# Patient Record
Sex: FEMALE | Race: WHITE | NOT HISPANIC OR LATINO | Employment: FULL TIME | URBAN - METROPOLITAN AREA
[De-identification: names, ages, dates, MRNs, and addresses within clinical notes are randomized per-mention and may not be internally consistent; named-entity substitution may affect disease eponyms.]

---

## 2017-02-10 ENCOUNTER — OUTSIDE FACILITY SERVICE (OUTPATIENT)
Dept: SURGERY | Facility: CLINIC | Age: 52
End: 2017-02-10

## 2017-02-10 PROCEDURE — 45378 DIAGNOSTIC COLONOSCOPY: CPT | Performed by: SURGERY

## 2017-10-19 ENCOUNTER — APPOINTMENT (OUTPATIENT)
Dept: WOMENS IMAGING | Facility: HOSPITAL | Age: 52
End: 2017-10-19

## 2017-10-19 PROCEDURE — 77067 SCR MAMMO BI INCL CAD: CPT | Performed by: RADIOLOGY

## 2017-10-19 PROCEDURE — 77063 BREAST TOMOSYNTHESIS BI: CPT | Performed by: RADIOLOGY

## 2018-03-27 ENCOUNTER — OFFICE VISIT (OUTPATIENT)
Dept: INTERNAL MEDICINE | Facility: CLINIC | Age: 53
End: 2018-03-27

## 2018-03-27 ENCOUNTER — HOSPITAL ENCOUNTER (OUTPATIENT)
Dept: GENERAL RADIOLOGY | Facility: HOSPITAL | Age: 53
Discharge: HOME | End: 2018-03-27
Admitting: INTERNAL MEDICINE

## 2018-03-27 VITALS
SYSTOLIC BLOOD PRESSURE: 112 MMHG | HEIGHT: 66 IN | DIASTOLIC BLOOD PRESSURE: 78 MMHG | BODY MASS INDEX: 30.74 KG/M2 | WEIGHT: 191.3 LBS | HEART RATE: 81 BPM | TEMPERATURE: 98 F | OXYGEN SATURATION: 98 %

## 2018-03-27 DIAGNOSIS — R19.00 PELVIC MASS IN FEMALE: ICD-10-CM

## 2018-03-27 DIAGNOSIS — R10.32 LEFT GROIN PAIN: Primary | ICD-10-CM

## 2018-03-27 PROCEDURE — 99213 OFFICE O/P EST LOW 20 MIN: CPT | Performed by: INTERNAL MEDICINE

## 2018-03-27 PROCEDURE — 73502 X-RAY EXAM HIP UNI 2-3 VIEWS: CPT

## 2018-03-27 RX ORDER — CHLORAL HYDRATE 500 MG
CAPSULE ORAL
COMMUNITY

## 2018-03-27 RX ORDER — NAPROXEN 500 MG/1
500 TABLET ORAL 2 TIMES DAILY WITH MEALS
Qty: 30 TABLET | Refills: 1 | Status: SHIPPED | OUTPATIENT
Start: 2018-03-27

## 2018-03-27 NOTE — PROGRESS NOTES
Subjective   Valerie Avina is a 52 y.o. female.   Pt has been having post/medial upper thigh pain    History of Present Illness   Pt is feeling numbness on the upper back thigh that radiates down the medial aspect of her left thigh.  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  She denies any trauma or change in physical activity    Review of Systems   All other systems reviewed and are negative.      Objective   Physical Exam   Constitutional: She is oriented to person, place, and time. She appears well-developed and well-nourished.   HENT:   Head: Normocephalic and atraumatic.   Right Ear: External ear normal.   Left Ear: External ear normal.   Mouth/Throat: Oropharynx is clear and moist.   Eyes: Conjunctivae and EOM are normal. Pupils are equal, round, and reactive to light.   Neck: Normal range of motion. No tracheal deviation present. No thyromegaly present.   Abdominal: Soft. Bowel sounds are normal. She exhibits no distension. There is no tenderness.   Musculoskeletal: Normal range of motion. She exhibits no edema or deformity.   Neurological: She is alert and oriented to person, place, and time.   Skin: Skin is warm and dry.   Psychiatric: She has a normal mood and affect. Her behavior is normal. Judgment and thought content normal.   Vitals reviewed.      Vitals:    03/27/18 0849   BP: 112/78   Pulse: 81   Temp: 98 °F (36.7 °C)   SpO2: 98%          Assessment/Plan   Valerie was seen today for leg pain.    Diagnoses and all orders for this visit:    Left groin pain  -     XR Hip With or Without Pelvis 2 - 3 View Left  -     naproxen (NAPROSYN) 500 MG tablet; Take 1 tablet by mouth 2 (Two) Times a Day With  Meals.    Pelvic mass in female      1.  Left groin pain/numbness in the leg-  Comes and goes  I suspect that this is related to scar tissues and weight gain.  We will check an xray and try an NSAID  If sx cont she will need a further MOHAN given hx of this benign pelvic tumor  2.  Pelvic mass-  By hx  I have no records  We will request and FU on this

## 2018-10-23 ENCOUNTER — APPOINTMENT (OUTPATIENT)
Dept: WOMENS IMAGING | Facility: HOSPITAL | Age: 53
End: 2018-10-23

## 2018-10-23 PROCEDURE — 77067 SCR MAMMO BI INCL CAD: CPT | Performed by: RADIOLOGY

## 2019-10-24 ENCOUNTER — APPOINTMENT (OUTPATIENT)
Dept: WOMENS IMAGING | Facility: HOSPITAL | Age: 54
End: 2019-10-24

## 2019-10-24 PROCEDURE — 77063 BREAST TOMOSYNTHESIS BI: CPT | Performed by: RADIOLOGY

## 2019-10-24 PROCEDURE — 77067 SCR MAMMO BI INCL CAD: CPT | Performed by: RADIOLOGY

## 2020-10-29 ENCOUNTER — APPOINTMENT (OUTPATIENT)
Dept: WOMENS IMAGING | Facility: HOSPITAL | Age: 55
End: 2020-10-29

## 2020-10-29 PROCEDURE — 77067 SCR MAMMO BI INCL CAD: CPT | Performed by: RADIOLOGY

## 2020-10-29 PROCEDURE — 77063 BREAST TOMOSYNTHESIS BI: CPT | Performed by: RADIOLOGY

## 2021-12-09 ENCOUNTER — APPOINTMENT (OUTPATIENT)
Dept: WOMENS IMAGING | Facility: HOSPITAL | Age: 56
End: 2021-12-09

## 2021-12-09 PROCEDURE — 76641 ULTRASOUND BREAST COMPLETE: CPT | Performed by: RADIOLOGY

## 2021-12-09 PROCEDURE — 77066 DX MAMMO INCL CAD BI: CPT | Performed by: RADIOLOGY

## 2021-12-09 PROCEDURE — G0279 TOMOSYNTHESIS, MAMMO: HCPCS | Performed by: RADIOLOGY

## 2021-12-09 PROCEDURE — 77061 BREAST TOMOSYNTHESIS UNI: CPT | Performed by: RADIOLOGY

## 2022-02-23 ENCOUNTER — TELEPHONE (OUTPATIENT)
Dept: SURGERY | Facility: CLINIC | Age: 57
End: 2022-02-23

## 2022-02-28 NOTE — TELEPHONE ENCOUNTER
Pt called and wanted to speak to Macy about scheduling a c- scope. Let pt know that Macy wont be in until Tuesday. Pt understood and will give msg to Macy

## 2022-03-03 ENCOUNTER — PREP FOR SURGERY (OUTPATIENT)
Dept: SURGERY | Facility: SURGERY CENTER | Age: 57
End: 2022-03-03

## 2022-03-03 DIAGNOSIS — Z86.010 PERSONAL HISTORY OF COLONIC POLYPS: Primary | ICD-10-CM

## 2022-03-17 ENCOUNTER — TELEPHONE (OUTPATIENT)
Dept: ORTHOPEDIC SURGERY | Facility: CLINIC | Age: 57
End: 2022-03-17

## 2022-03-17 NOTE — TELEPHONE ENCOUNTER
Caller: Valerie Avina    Relationship to patient: Self    Best call back number:[phone]  Patient is needing: CALLBACK TO SCHEDULE  GRADE 2  RT ANKLE SPRAIN; PATIENT WAS SEEN IN [place] E/R 3.12.22 AND HAD XRAYS TAKEN   PATIENT IS CURRENTLY WEARING A BOOT    PATIENT WAS FORMER PATIENT OF BRYAN AND REQUESTED HIM      UNABLE TO WARM TRANSFER

## 2022-03-23 ENCOUNTER — OFFICE VISIT (OUTPATIENT)
Dept: ORTHOPEDIC SURGERY | Facility: CLINIC | Age: 57
End: 2022-03-23

## 2022-03-23 VITALS — BODY MASS INDEX: 29.89 KG/M2 | TEMPERATURE: 96.7 F | HEIGHT: 66 IN | WEIGHT: 186 LBS

## 2022-03-23 DIAGNOSIS — S92.214A CLOSED NONDISPLACED FRACTURE OF CUBOID OF RIGHT FOOT, INITIAL ENCOUNTER: ICD-10-CM

## 2022-03-23 DIAGNOSIS — R52 PAIN: Primary | ICD-10-CM

## 2022-03-23 DIAGNOSIS — M79.671 PAIN ASSOCIATED WITH ACCESSORY NAVICULAR BONE OF FOOT, RIGHT: ICD-10-CM

## 2022-03-23 DIAGNOSIS — Q74.2 PAIN ASSOCIATED WITH ACCESSORY NAVICULAR BONE OF FOOT, RIGHT: ICD-10-CM

## 2022-03-23 PROCEDURE — 73610 X-RAY EXAM OF ANKLE: CPT | Performed by: ORTHOPAEDIC SURGERY

## 2022-03-23 PROCEDURE — 73620 X-RAY EXAM OF FOOT: CPT | Performed by: ORTHOPAEDIC SURGERY

## 2022-03-23 PROCEDURE — 99204 OFFICE O/P NEW MOD 45 MIN: CPT | Performed by: ORTHOPAEDIC SURGERY

## 2022-03-23 RX ORDER — HYDROCODONE BITARTRATE AND ACETAMINOPHEN 7.5; 325 MG/1; MG/1
TABLET ORAL
COMMUNITY
Start: 2022-03-12 | End: 2022-04-20

## 2022-03-23 RX ORDER — METHOCARBAMOL 750 MG/1
TABLET, FILM COATED ORAL
COMMUNITY
Start: 2022-03-12

## 2022-03-23 RX ORDER — CHOLECALCIFEROL (VITAMIN D3) 125 MCG
5 CAPSULE ORAL
COMMUNITY

## 2022-03-23 RX ORDER — ZOLPIDEM TARTRATE 10 MG/1
10 TABLET ORAL NIGHTLY PRN
COMMUNITY
Start: 2022-03-15

## 2022-03-23 NOTE — PROGRESS NOTES
New Patient Complaint      Patient: Valerie Avina  YOB: 1965 56 y.o. female  Medical Record Number: [id number]    Chief Complaints: I hurt my ankle    History of Present Illness: Patient injured her right ankle on 3/8/2022 when she was running down some back steps and rolled on a sidewalk with onset of pain and swelling mainly in the inferolateral and medial aspects of the right ankle.    She was without prior history of injury to the ankle.    She was seen in the ER on 3/12/2022 and has been using a boot but no other assistive device with moderate aching pain mainly over the inferomedial and inferolateral aspect of the right midfoot hindfoot area more so than at the ankle itself.  She does report that the boot has aggravated her great toe to some degree on the right foot    I did a bunion surgery on her left foot in remote past which she said is doing great         HPI    Allergies: No Known Allergies    Medications:   Current Outpatient Medications on File Prior to Visit   Medication Sig   • melatonin 5 MG tablet tablet Take 5 mg by mouth.   • Omega-3 Fatty Acids (FISH OIL) 1000 MG capsule capsule Take  by mouth Daily With Breakfast.   • buPROPion XL (WELLBUTRIN XL) 300 MG 24 hr tablet Take 300 mg by mouth daily.   • naproxen (NAPROSYN) 500 MG tablet Take 1 tablet by mouth 2 (Two) Times a Day With Meals.   • Omega-3 Fatty Acids (OMEGA-3 1450 PO) Take  by mouth.   • [DISCONTINUED] zolpidem (AMBIEN) 5 MG tablet Take 5 mg by mouth at night as needed for sleep.     No current facility-administered medications on file prior to visit.       Past Medical History:   Diagnosis Date   • Ankle sprain 2weeks ago   • Anxiety    • Depression    • Fracture of wrist 20 years ago   • Insomnia    • Sleep difficulties    • Tear of meniscus of knee 10 years ago     Past Surgical History:   Procedure Laterality Date   • FOOT SURGERY Left    • HYSTERECTOMY     • KNEE ARTHROSCOPY Right    • NASAL SEPTUM SURGERY     •  "SEPTOPLASTY       Social History     Occupational History   • Not on file   Tobacco Use   • Smoking status: Passive Smoke Exposure - Never Smoker   • Smokeless tobacco: Never Used   • Tobacco comment: None   Substance and Sexual Activity   • Alcohol use: No   • Drug use: No   • Sexual activity: Yes     Partners: Female     Birth control/protection: None     Comment: Menapause      Social History     Social History Narrative   • Not on file     Family History   Problem Relation Age of Onset   • Cancer Mother    • Heart failure Father        Review of Systems: 14 point review of systems performed, positive pertinent findings identified in HPI. All remaining systems negative     Review of Systems      Physical Exam:   Vitals:    03/23/22 1032   Temp: 96.7 °F (35.9 °C)   Weight: 84.4 kg (186 lb)   Height: 167.6 cm (66\")     Physical Exam   Constitutional: pleasant, well developed   Eyes: sclera non icteric  Hearing : adequate for exam  Cardiovascular: palpable pulses in right foot, right calf/ thigh NT without sign of DVT  Respiratoy: breathing unlabored   Neurological: grossly sensate to LT throughout right LE  Psychiatric: oriented with normal mood and affect.   Lymphatic: No palpable popliteal lymphadenopathy right LE  Skin: intact throughout right leg/foot  Musculoskeletal: On exam she is ambulating without assistive device.  She was without focal tenderness along the anterolateral ligamentous structures or peroneal tendons of the right ankle were medial malleolus.  There is moderate discomfort along the medial aspect of the navicular but was able to actively invert well with minimal discomfort.  There was moderate discomfort in the inferolateral hindfoot along the calcaneocuboid joint.  She was nontender at all over the dorsum of the midfoot.  There was minimal discomfort on the right great toe which showed very slight hallux valgus  Physical Exam  Ortho Exam    Radiology: 3 views of the right ankle including " lateral view of the foot and 2 views of the right foot ordered evaluate pain reviewed and no prior x-rays available for comparison.  I do not see any obvious fracture or widening of the syndesmosis talus appears to be well-seated.  There may be a small bone island in the anterior distal tibia..    There does appear to have a large accessory navicular I do not see clear fracture of the medial navicular.  There appears to be a small avulsion over the distal aspect of the calcaneocuboid joint adjacent to the lateral aspect of the proximal cuboid.  I do not see a clear fracture of the anterior process of the calcaneus.  There is mild hallux valguswith some arthritis of the first MTP joint.  There may also be a nondisplaced fracture of the proximal extent of the middle cuneiform I do not see any widening of the Lisfranc interval.        Assessment/Plan: 1.  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  Right ankle calcaneocuboid capsular injury with cuboid avulsion fracture  3.  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  4.  Possible right middle cuneiform fracture    We discussed treatment going forward and I do not see any focal pathology about the ankle or peroneal tendons that I think would necessitate further work-up at this time.    She may have had some disruption of the synchondrosis around the medial accessory navicular but would not recommend surgical treatment at this time so we will hold off on any further work-up.    Reviewed her that I would not recommend any surgical treatment for the calcaneocuboid injury at this point and could even have other occult fractures but would not change our treatment protocol.    We will have her continue with the boot for at least another 2 weeks and is going to get a power step orthotic to place in that.  She will offload this with a cane in the contralateral hand.    She is doing well in 2 weeks she may then start transitioning to a PT TD  brace in the house for a week and boot out of the house and if does well over that period may then transition to just a PT TD brace but continue with use of the cane.    We will see her back in 4 weeks with x-rays of her right foot if she still having pain.

## 2022-04-20 ENCOUNTER — OFFICE VISIT (OUTPATIENT)
Dept: ORTHOPEDIC SURGERY | Facility: CLINIC | Age: 57
End: 2022-04-20

## 2022-04-20 VITALS — TEMPERATURE: 96.6 F | WEIGHT: 194.2 LBS | BODY MASS INDEX: 31.21 KG/M2 | HEIGHT: 66 IN

## 2022-04-20 DIAGNOSIS — M19.071 ARTHRITIS OF FIRST METATARSOPHALANGEAL (MTP) JOINT OF RIGHT FOOT: ICD-10-CM

## 2022-04-20 DIAGNOSIS — S92.214D CLOSED NONDISPLACED FRACTURE OF CUBOID OF RIGHT FOOT WITH ROUTINE HEALING, SUBSEQUENT ENCOUNTER: Primary | ICD-10-CM

## 2022-04-20 DIAGNOSIS — Q74.2 PAIN ASSOCIATED WITH ACCESSORY NAVICULAR BONE OF FOOT, RIGHT: ICD-10-CM

## 2022-04-20 DIAGNOSIS — M79.671 PAIN ASSOCIATED WITH ACCESSORY NAVICULAR BONE OF FOOT, RIGHT: ICD-10-CM

## 2022-04-20 DIAGNOSIS — M20.11 HALLUX VALGUS OF RIGHT FOOT: ICD-10-CM

## 2022-04-20 PROCEDURE — 99213 OFFICE O/P EST LOW 20 MIN: CPT | Performed by: ORTHOPAEDIC SURGERY

## 2022-04-20 PROCEDURE — 73630 X-RAY EXAM OF FOOT: CPT | Performed by: ORTHOPAEDIC SURGERY

## 2022-04-20 RX ORDER — DICLOFENAC SODIUM 20 MG/G
1 SOLUTION TOPICAL 2 TIMES DAILY
Qty: 112 G | Refills: 11 | Status: SHIPPED | OUTPATIENT
Start: 2022-04-20

## 2022-04-20 NOTE — PROGRESS NOTES
Ankle Follow Up      Patient: Valerie Avina    YOB: 1965 56 y.o. female    Chief Complaints: Ankle feeling better    History of Present Illness:Patient was seen on 3/23/2022 after she injured her right ankle on 3/8/2022 when she was running down some back steps and rolled on a sidewalk with onset of pain and swelling mainly in the inferolateral and medial aspects of the right ankle.     She was without prior history of injury to the ankle.     She was seen in the ER on 3/12/2022 and had been using a boot but no other assistive device with moderate aching pain mainly over the inferomedial and inferolateral aspect of the right midfoot hindfoot area more so than at the ankle itself.  She reported that the boot had aggravated her great toe to some degree on the right foot     I did a bunion surgery on her left foot in remote past which she said was doing great.     Reviewed with her that the of her symptoms seem to be coming from her accessory navicular and reviewed her there was possible middle cuneiform fracture and calcaneocuboid capsule ligamentous injury.   However given her lack of pain on the peroneal tendons no further work-up was recommended at that time and she was instructed to continue with her boot and to get a power step orthotic to use in it and offload with a cane and do all that for at least another 2 weeks and then to transition to a PT TD brace in the house for a week and then boot out of the house for a week and then to transition to the PT TD brace.    She is seen back today stating that she never used the cane and quit using the boot 10 days ago.  She really could not use the PT TD brace as it did not fit in her shoe and she is not having any pain at all in the inferolateral aspect of the right hindfoot and the pain over the inferomedial aspect of the hindfoot has improved at least 80%.   HPI    ROS: ankle pain  Past Medical History:   Diagnosis Date   • Ankle sprain 2weeks ago   •  "Anxiety    • Depression    • Fracture of wrist 20 years ago   • Insomnia    • Sleep difficulties    • Tear of meniscus of knee 10 years ago       Physical Exam:   Vitals:    04/20/22 0950   Temp: 96.6 °F (35.9 °C)   Weight: 88.1 kg (194 lb 3.2 oz)   Height: 167.6 cm (66\")     Well developed with normal mood.  Exam she has some mild discomfort over the inferomedial hindfoot along her accessory navicular and no pain at all to palpation over the cuboid or anterolateral ligamentous structures nor peroneal tendons.  Nontender over the dorsum of the midfoot      Radiology: 3 views of the right foot ordered evaluate pain reviewed and compared to previous x-rays.  The questionable area of fracture at the proximal extent of the middle cuneiform is less evident today.  There still remains a large accessory navicular without change in alignment and possible fracture of the cuboid through the midportion of the body that was not is evident on previous x-rays and is only evident on the AP view only on the oblique or lateral films today.  The small capsuloligamentous potential injury at the calcaneocuboid joint appears to be healing.  There remains some mild hallux valgus with first MTP arthritis      Assessment/Plan: 1.  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  Right ankle calcaneocuboid capsular injury with cuboid avulsion fracture  3.  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  4.  Possible right middle cuneiform fracture  5.  Possible right cuboid fracture    We discussed treatment and reviewed her x-ray findings with her and although there is some questionable area of fracture through the midportion of the cuboid she is not at all tender in that area so this may not be real and would not require any surgical treatment at this time if it does as it is not symptomatic.    She is still only having some symptoms around the medial navicular but is markedly improved subtle think we need to do " any further work-up.    She will continue with sturdy stiff accommodative shoes with arch support and limit activities to that of daily living only with no running or jumping.  I did prescribe Pennsaid for to apply to the area over the inferomedial hindfoot along her accessory navicular twice daily.    If anything worsens at all she will get office and let me know otherwise I will see her back in 4 weeks with x-rays of her right foot.

## 2022-05-05 PROBLEM — Z86.010 PERSONAL HISTORY OF COLONIC POLYPS: Status: ACTIVE | Noted: 2022-05-05

## 2022-05-05 PROBLEM — Z86.0100 PERSONAL HISTORY OF COLONIC POLYPS: Status: ACTIVE | Noted: 2022-05-05

## 2022-05-11 NOTE — SIGNIFICANT NOTE
Education provided the Patient on the following:    - Nothing to Eat or Drink after MN the night before the procedure    - Avoid red/purple fluids while completing their bowel prep as ordered by physician  -Contact Gastrointerologist office for any questions about specific details regarding colon prep    -You will need to have someone drive you home after your colonoscopy and remain with you for 24 hours after the procedure  - The date of your Surgery, you may have one visitor at bedside or within 10-15 minutes of [facility]  -Please wear warm socks when you arrive for your colonoscopy  -Remove all jewelry and leave any valuables before arriving the day of your procedure (all will have to be removed before leaving preop)  -You will need to arrive at 0730 on 5/16 for your colonoscopy    -Feel free to contact us at: [phone] with any additional questions/concerns

## 2022-05-16 ENCOUNTER — ANESTHESIA EVENT (OUTPATIENT)
Dept: SURGERY | Facility: SURGERY CENTER | Age: 57
End: 2022-05-16

## 2022-05-16 ENCOUNTER — HOSPITAL ENCOUNTER (OUTPATIENT)
Facility: SURGERY CENTER | Age: 57
Setting detail: HOSPITAL OUTPATIENT SURGERY
Discharge: HOME | End: 2022-05-16
Attending: SURGERY | Admitting: SURGERY

## 2022-05-16 ENCOUNTER — ANESTHESIA (OUTPATIENT)
Dept: SURGERY | Facility: SURGERY CENTER | Age: 57
End: 2022-05-16

## 2022-05-16 VITALS
RESPIRATION RATE: 16 BRPM | TEMPERATURE: 97.3 F | OXYGEN SATURATION: 96 % | DIASTOLIC BLOOD PRESSURE: 66 MMHG | HEART RATE: 67 BPM | BODY MASS INDEX: 30.18 KG/M2 | SYSTOLIC BLOOD PRESSURE: 107 MMHG | WEIGHT: 187 LBS

## 2022-05-16 PROCEDURE — 45378 DIAGNOSTIC COLONOSCOPY: CPT | Performed by: SURGERY

## 2022-05-16 PROCEDURE — 25010000002 PROPOFOL 10 MG/ML EMULSION: Performed by: ANESTHESIOLOGY

## 2022-05-16 PROCEDURE — S0260 H&P FOR SURGERY: HCPCS | Performed by: SURGERY

## 2022-05-16 RX ORDER — LIDOCAINE HYDROCHLORIDE 10 MG/ML
0.5 INJECTION, SOLUTION INFILTRATION; PERINEURAL ONCE AS NEEDED
Status: DISCONTINUED | OUTPATIENT
Start: 2022-05-16 | End: 2022-05-16 | Stop reason: HOSPADM

## 2022-05-16 RX ORDER — SODIUM CHLORIDE, SODIUM LACTATE, POTASSIUM CHLORIDE, CALCIUM CHLORIDE 600; 310; 30; 20 MG/100ML; MG/100ML; MG/100ML; MG/100ML
INJECTION, SOLUTION INTRAVENOUS CONTINUOUS PRN
Status: DISCONTINUED | OUTPATIENT
Start: 2022-05-16 | End: 2022-05-16 | Stop reason: SURG

## 2022-05-16 RX ORDER — PROPOFOL 10 MG/ML
VIAL (ML) INTRAVENOUS AS NEEDED
Status: DISCONTINUED | OUTPATIENT
Start: 2022-05-16 | End: 2022-05-16 | Stop reason: SURG

## 2022-05-16 RX ORDER — MAGNESIUM HYDROXIDE 1200 MG/15ML
LIQUID ORAL AS NEEDED
Status: DISCONTINUED | OUTPATIENT
Start: 2022-05-16 | End: 2022-05-16 | Stop reason: HOSPADM

## 2022-05-16 RX ORDER — PROPOFOL 10 MG/ML
VIAL (ML) INTRAVENOUS CONTINUOUS PRN
Status: DISCONTINUED | OUTPATIENT
Start: 2022-05-16 | End: 2022-05-16 | Stop reason: SURG

## 2022-05-16 RX ORDER — SODIUM CHLORIDE, SODIUM LACTATE, POTASSIUM CHLORIDE, CALCIUM CHLORIDE 600; 310; 30; 20 MG/100ML; MG/100ML; MG/100ML; MG/100ML
1000 INJECTION, SOLUTION INTRAVENOUS CONTINUOUS
Status: DISCONTINUED | OUTPATIENT
Start: 2022-05-16 | End: 2022-05-16 | Stop reason: HOSPADM

## 2022-05-16 RX ORDER — SODIUM CHLORIDE 0.9 % (FLUSH) 0.9 %
10 SYRINGE (ML) INJECTION AS NEEDED
Status: DISCONTINUED | OUTPATIENT
Start: 2022-05-16 | End: 2022-05-16 | Stop reason: HOSPADM

## 2022-05-16 RX ORDER — LIDOCAINE HYDROCHLORIDE 20 MG/ML
INJECTION, SOLUTION INFILTRATION; PERINEURAL AS NEEDED
Status: DISCONTINUED | OUTPATIENT
Start: 2022-05-16 | End: 2022-05-16 | Stop reason: SURG

## 2022-05-16 RX ADMIN — SODIUM CHLORIDE, POTASSIUM CHLORIDE, SODIUM LACTATE AND CALCIUM CHLORIDE 1000 ML: 600; 310; 30; 20 INJECTION, SOLUTION INTRAVENOUS at 08:32

## 2022-05-16 RX ADMIN — SODIUM CHLORIDE, SODIUM LACTATE, POTASSIUM CHLORIDE, AND CALCIUM CHLORIDE: .6; .31; .03; .02 INJECTION, SOLUTION INTRAVENOUS at 08:52

## 2022-05-16 RX ADMIN — Medication 160 MCG/KG/MIN: at 08:52

## 2022-05-16 RX ADMIN — LIDOCAINE HYDROCHLORIDE 60 MG: 20 INJECTION, SOLUTION INFILTRATION; PERINEURAL at 08:52

## 2022-05-16 RX ADMIN — PROPOFOL 100 MG: 10 INJECTION, EMULSION INTRAVENOUS at 08:52

## 2022-05-16 NOTE — OP NOTE
Operative Note:    Pre-op dx: Screening Colonoscopy, personal history of polyps    Post-op dx: diverticulosis    Procedure: Colonoscopy    Surgeon: Khadra Sanchez MD    Anesthesia: MAC    EBL: none    Specimen:  * No orders in the log *    Complications: none    Procedure:    Colonoscopy:  A digital rectal examination was performed which revealed no rectal masses.  Scope was introduced into the rectum and advanced into the sigmoid, descending colon, splenic flexure, transverse colon, hepatic flexure, ascending colon and into the cecum.  Cecum was identified by the ileocecal valve and appendiceal orifice.  Patient had mild diverticulosis in the sigmoid colon.  There was no evidence of any polyps, masses, AV malformation or inflammation.  The bowel preparation was excellent. The scope was slowly withdrawn and a second look was performed.  Upon the second look, there were no new findings.  The colon was desufflated and the procedure was terminated.   Patient was transferred to recovery room in stable condition.      Assessment/Plan:  Repeat colonoscopy in 5 years.  Personal history of polyps  Mild diverticulosis     Khadra Sanchez MD  General, Minimally Invasive and Endoscopic Surgery  [facility] [place] [id number] [facility] [address] [place] [address]   [address]    [address]  [place] [address]               [place] [address]    P: [phone]  F: [phone]    Cc:  Dennise Gandhi MD

## 2022-05-16 NOTE — ANESTHESIA POSTPROCEDURE EVALUATION
Patient: Valerie Avina    Procedure Summary     Date: 05/16/22 Room / Location: [facility] OR 05 / [facility]    Anesthesia Start: 0847 Anesthesia Stop: 0923    Procedure: COLONOSCOPY (N/A ) Diagnosis:       Personal history of colonic polyps      (Personal history of colonic polyps [Z86.010])    Surgeons: Khadra Sanchez MD Provider: Kush Fu MD    Anesthesia Type: MAC ASA Status: 2          Anesthesia Type: MAC    Vitals  Vitals Value Taken Time   /66 05/16/22 0941   Temp 36.3 °C (97.3 °F) 05/16/22 0922   Pulse 67 05/16/22 0941   Resp 16 05/16/22 0941   SpO2 96 % 05/16/22 0941           Post Anesthesia Care and Evaluation    Patient location during evaluation: bedside  Patient participation: complete - patient participated  Level of consciousness: awake and alert  Pain management: adequate  Airway patency: patent  Anesthetic complications: No anesthetic complications    Cardiovascular status: acceptable  Respiratory status: acceptable  Hydration status: acceptable    Comments: /66   Pulse 67   Temp 36.3 °C (97.3 °F)   Resp 16   Wt 84.8 kg (187 lb)   SpO2 96%   BMI 30.18 kg/m²

## 2022-05-16 NOTE — ANESTHESIA PREPROCEDURE EVALUATION
Anesthesia Evaluation     Patient summary reviewed and Nursing notes reviewed   NPO Solid Status: > 8 hours  NPO Liquid Status: > 2 hours           Airway   Mallampati: I  TM distance: >3 FB  Neck ROM: full  No difficulty expected  Dental - normal exam     Pulmonary - negative pulmonary ROS and normal exam   Cardiovascular - normal exam  Exercise tolerance: good (4-7 METS)    (-) hypertension      Neuro/Psych- negative ROS  (-) seizures, CVA  GI/Hepatic/Renal/Endo - negative ROS   (-) diabetes    Musculoskeletal (-) negative ROS    Abdominal  - normal exam    Bowel sounds: normal.   Substance History - negative use  (-) alcohol use, drug use     OB/GYN negative ob/gyn ROS         Other - negative ROS                       Anesthesia Plan    ASA 2     MAC     intravenous induction     Anesthetic plan, all risks, benefits, and alternatives have been provided, discussed and informed consent has been obtained with: patient.        CODE STATUS:

## 2022-05-16 NOTE — H&P
Date: 2022     Patient: Valerie Avina    : 1965   [id number]     CC:   Colonoscopy, with personal history of colon polyps    History:   The patient is a 57 y.o. female of Dennise Gandhi MD  who presents to discuss colonoscopy with personal history of colon polyps, last colonoscopy was . The patient currently has no complaints.  Patient denies any history of nausea, abdominal pain, weight loss, change in bowel habits or rectal bleeding.  Patient denies melena, hematochezia or BRBPR.  No family history of ulcerative colitis, Crohn's disease or familial polyposis.    The following portions of the patient's history were reviewed and updated as appropriate: allergies, current medications, past family history, past medical history, past social history, past surgical history and problem list.    Past Medical History:   Diagnosis Date   • Ankle sprain 2weeks ago   • Anxiety    • Depression    • Fracture of wrist 20 years ago   • Insomnia    • Sleep difficulties    • Tear of meniscus of knee 10 years ago      Past Surgical History:   Procedure Laterality Date   • FOOT SURGERY Left    • HYSTERECTOMY     • KNEE ARTHROSCOPY Right    • NASAL SEPTUM SURGERY     • SEPTOPLASTY       Medications:   Medications Prior to Admission   Medication Sig Dispense Refill Last Dose   • buPROPion XL (WELLBUTRIN XL) 300 MG 24 hr tablet Take 300 mg by mouth daily.   5/15/2022 at Unknown time   • melatonin 5 MG tablet tablet Take 5 mg by mouth.   Past Week at Unknown time   • Omega-3 Fatty Acids (FISH OIL) 1000 MG capsule capsule Take  by mouth Daily With Breakfast.   Past Week at Unknown time   • zolpidem (AMBIEN) 10 MG tablet Take 10 mg by mouth At Night As Needed.   Past Week at Unknown time   • Diclofenac Sodium (Pennsaid) 2 % solution Apply 1 Pump topically 2 (Two) Times a Day. 112 g 11    • fluocinonide (LIDEX) 0.05 % cream APPLY TO AFFECTED AREA TWICE A DAY      • methocarbamol (ROBAXIN) 750 MG tablet TAKE 1-2 TABLETS BY  MOUTH 3 (THREE) TIMES DAILY AS NEEDED (MUSCLE SPASM).   More than a month at Unknown time   • naproxen (NAPROSYN) 500 MG tablet Take 1 tablet by mouth 2 (Two) Times a Day With Meals. 30 tablet 1 More than a month at Unknown time     Allergies: Patient has no known allergies.   Social History:  Social History     Socioeconomic History   • Marital status:    Tobacco Use   • Smoking status: Passive Smoke Exposure - Never Smoker   • Smokeless tobacco: Never Used   • Tobacco comment: None   Substance and Sexual Activity   • Alcohol use: No   • Drug use: No   • Sexual activity: Yes     Partners: Female     Birth control/protection: None     Comment: Menapause      Family History   Problem Relation Age of Onset   • Cancer Mother    • Heart failure Father         Review of Systems:   General: Patient reports good health  Eyes: No eye problems  Ears, nose, mouth and throat: No rhinitis, no hearing problems, no chronic cough  Cardiovascular/heart: Denies palpitations, syncope or chest pain  Respiratory/lung: Denies shortness of breath, hemoptysis, or dyspnea on exertion   Genital/urinary: No frequency, hematuria or dysuria  Hematological/lymphatic: Denies anemia or other problems  Musculoskeletal: No joint pain, no defects  Skin: No psoriasis or other skin issues  Neurological: No seizures or other neurological problems  Psychiatric: None  Endocrine: Negative  Gastro-intestinal: No constipation, no diarrhea, no melena, no hematochezia    /99 (BP Location: Left arm, Patient Position: Lying)   Pulse 92   Temp 97 °F (36.1 °C)   Resp 16   Wt 84.8 kg (187 lb)   SpO2 97%   BMI 30.18 kg/m²     Physical Examination:  General: Alert and oriented x3 in no acute distress  HEENT: Normal cephalic, atraumatic, PERRLA, EOMI, sclera anicteric, moist mucous membranes, neck is supple, no JVD, no carotid bruits, no thyromegaly no adenopathy  Chest: CTA and percussion  CVA: RRR, normal S1-S2, no murmurs, no gallops or  rubs  Abdomen: Positive BS, soft, nondistended, nontender, no rebound, no guarding, no hernias, no organomegaly and no masses  Extremities: Full range of motion, no clubbing, no cyanosis or edema  Neurovascular: Grossly intact  Debilities: None  Emotional Behavior: Appropriate     Impression:  57 y.o. female for a colonoscopy with personal history of colon polyps.    Plan:  Patient is presenting for a colonoscopy with personal history of colon polyps.  I have recommended that the patient undergo a colonoscopy in accordance of American Cancer Society's guidelines.  I have discussed this procedure in detail with the patient.  I have discussed the risks, benefits and alternatives.  I have discussed the risk of anesthesia, bleeding and perforation.  Patient understands these risks, benefits and alternatives and wishes to proceed.      Khadra Sanchez MD  General, Minimally Invasive and Endoscopic Surgery  [facility] [place] [id number] [address]    [address]  [address] [address]    P: [phone]  F: [phone]    Cc:  Dennise Gandhi MD

## 2022-05-19 ENCOUNTER — TELEPHONE (OUTPATIENT)
Dept: SURGERY | Facility: CLINIC | Age: 57
End: 2022-05-19

## 2022-05-19 NOTE — TELEPHONE ENCOUNTER
Spoke to pt about her colonoscopy and let her know that there was no evidence of any polyps, masses, AV malformation or inflammation, but  patient had mild diverticulosis in the sigmoid colon. Pt is to repeat colonoscopy in 5 years due to personal history of polyps. Pt did not have any questions at this time

## 2022-09-02 NOTE — TELEPHONE ENCOUNTER
Left vm to call back to discuss colonoscopy    General Sunscreen Counseling: Broad spectrum sunscreen should be applied especially during peak UV exposure; at least an SPF 30 but SPF 50 would be better.  Reapply every 1-2 hours, more importantly after exercise and swimming. Detail Level: Simple

## 2022-12-02 ENCOUNTER — APPOINTMENT (OUTPATIENT)
Dept: WOMENS IMAGING | Facility: HOSPITAL | Age: 57
End: 2022-12-02

## 2022-12-02 PROCEDURE — 77067 SCR MAMMO BI INCL CAD: CPT | Performed by: RADIOLOGY

## 2022-12-02 PROCEDURE — 77063 BREAST TOMOSYNTHESIS BI: CPT | Performed by: RADIOLOGY

## 2023-03-07 ENCOUNTER — OFFICE VISIT (OUTPATIENT)
Dept: INTERNAL MEDICINE | Facility: CLINIC | Age: 58
End: 2023-03-07
Payer: COMMERCIAL

## 2023-03-07 VITALS
HEIGHT: 66 IN | HEART RATE: 70 BPM | BODY MASS INDEX: 31.34 KG/M2 | SYSTOLIC BLOOD PRESSURE: 134 MMHG | DIASTOLIC BLOOD PRESSURE: 68 MMHG | OXYGEN SATURATION: 98 % | WEIGHT: 195 LBS | TEMPERATURE: 97.5 F

## 2023-03-07 DIAGNOSIS — Z00.00 HEALTHCARE MAINTENANCE: Primary | ICD-10-CM

## 2023-03-07 DIAGNOSIS — E66.9 CLASS 1 OBESITY WITHOUT SERIOUS COMORBIDITY WITH BODY MASS INDEX (BMI) OF 31.0 TO 31.9 IN ADULT, UNSPECIFIED OBESITY TYPE: ICD-10-CM

## 2023-03-07 PROBLEM — H16.229 KERATOCONJUNCTIVITIS SICCA NOT DUE TO SJOGREN'S SYNDROME: Status: ACTIVE | Noted: 2021-03-01

## 2023-03-07 PROCEDURE — 99386 PREV VISIT NEW AGE 40-64: CPT | Performed by: NURSE PRACTITIONER

## 2023-03-07 PROCEDURE — 99213 OFFICE O/P EST LOW 20 MIN: CPT | Performed by: NURSE PRACTITIONER

## 2023-03-07 RX ORDER — PEN NEEDLE, DIABETIC 32GX 5/32"
NEEDLE, DISPOSABLE MISCELLANEOUS SEE ADMIN INSTRUCTIONS
COMMUNITY
Start: 2022-12-24

## 2023-03-07 RX ORDER — HYDROCODONE BITARTRATE AND ACETAMINOPHEN 7.5; 325 MG/1; MG/1
1 TABLET ORAL
COMMUNITY
Start: 2022-09-14

## 2023-03-07 RX ORDER — LIRAGLUTIDE 6 MG/ML
INJECTION, SOLUTION SUBCUTANEOUS
COMMUNITY
Start: 2022-12-28 | End: 2023-03-07

## 2023-03-07 NOTE — PROGRESS NOTES
"Subjective   Valerie Avina is a 57 y.o. female and is here for a comprehensive physical exam. New patient here to establish care.  Health history and questionnaire have been reviewed in its entirety. The patient's previous primary care provider was Dr. Gandhi (2018).   The patient reports : Patient is having trouble losing weight.  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  She does very recently started walking again for exercise.  She had ankle surgery a few months ago.  Patient was on Saxenda in the past and was doing well with it, but then she was unable to get the medication due to availability.    Do you take any herbs or supplements that were not prescribed by a doctor? melatonin     History:  LMP: No LMP recorded. Patient has had a hysterectomy.  Patient is seen at [facility]    The following portions of the patient's history were reviewed and updated as appropriate: allergies, current medications, past family history, past medical history, past social history, past surgical history and problem list.    Review of Systems  Do you have pain that bothers you in your daily life? no  Pertinent items are noted in HPI.    Objective   /68   Pulse 70   Temp 97.5 °F (36.4 °C)   Ht 167.6 cm (65.98\")   Wt 88.5 kg (195 lb)   SpO2 98%   BMI 31.49 kg/m²     General Appearance:    Alert, cooperative, no distress, appears stated age   Head:    Normocephalic, without obvious abnormality, atraumatic   Eyes:    PERRL, conjunctiva/corneas clear, EOM's intact, both eyes   Ears:    Normal TM's and external ear canals, both ears   Nose:   Nares normal, septum midline, mucosa normal, no drainage    or sinus tenderness   Throat:   Lips, mucosa, and tongue normal; teeth and gums normal   Neck:   Supple, symmetrical, trachea midline, no adenopathy;     thyroid:  no enlargement/tenderness/nodules; no carotid    bruit   Back:     Symmetric, no curvature, ROM normal, no CVA tenderness   Lungs:     Clear to auscultation " bilaterally, respirations unlabored   Chest Wall:    No tenderness or deformity    Heart:    Regular rate and rhythm, S1 and S2 normal, no murmur       Abdomen:     Soft, non-tender, bowel sounds active all four quadrants,     no masses, no organomegaly           Extremities:   Extremities normal, atraumatic, no cyanosis or edema   Pulses:   2+ and symmetric all extremities   Skin:   Skin color, texture, turgor normal, no rashes or lesions   Lymph nodes:   Cervical, supraclavicular, and axillary nodes normal   Neurologic:   Grossly intact, normal strength, sensation and reflexes     throughout        Assessment & Plan   Healthy female exam.      1. Diagnoses and all orders for this visit:    1. Healthcare maintenance (Primary)  -     Comprehensive Metabolic Panel  -     Hemoglobin A1c  -     Lipid Panel With / Chol / HDL Ratio  -     Vitamin D,25-Hydroxy  -     TSH  -     CBC & Differential  -     Hepatitis C Antibody    2. Class 1 obesity without serious comorbidity with body mass index (BMI) of 31.0 to 31.9 in adult, unspecified obesity type  -     Liraglutide (SAXENDA) 18 MG/3ML injection pen; Inject 0.6mg under skin daily for week one, THEN 1.2mg daily for week two, THEN 1.8mg daily for week three, then 2.4mg daily for week four.  Dispense: 3 mL; Refill: 3      Obesity - will start saxenda. F/u in 2 months    Elevated fasting glucose -patient's fasting glucose was 103 about 7 years ago, so will check hemoglobin A1c today.  Patient did have a few bites of yogurt this morning    2. Patient Counseling:  --Nutrition: Stressed importance of moderation in sodium/caffeine intake, saturated fat and cholesterol, caloric balance, sufficient intake of fresh fruits, vegetables, fiber, calcium, iron. Decreased red meat; eats fish and chicken  --Exercise: Stressed the importance of regular exercise.   --Dental health: Discussed importance of regular tooth brushing, flossing, and dental visits.  --Immunizations reviewed.    3.  Discussed the patient's BMI with her.  The BMI is above average; BMI management plan is completed  4. Follow up in 2 months. Will call with lab results.

## 2023-03-08 LAB
25(OH)D3+25(OH)D2 SERPL-MCNC: 59.1 NG/ML (ref 30–100)
ALBUMIN SERPL-MCNC: 4.6 G/DL (ref 3.5–5.2)
ALBUMIN/GLOB SERPL: 1.6 G/DL
ALP SERPL-CCNC: 64 U/L (ref 39–117)
ALT SERPL-CCNC: 31 U/L (ref 1–33)
AST SERPL-CCNC: 20 U/L (ref 1–32)
BASOPHILS # BLD AUTO: 0.04 10*3/MM3 (ref 0–0.2)
BASOPHILS NFR BLD AUTO: 0.8 % (ref 0–1.5)
BILIRUB SERPL-MCNC: 0.3 MG/DL (ref 0–1.2)
BUN SERPL-MCNC: 21 MG/DL (ref 6–20)
BUN/CREAT SERPL: 26.3 (ref 7–25)
CALCIUM SERPL-MCNC: 9.7 MG/DL (ref 8.6–10.5)
CHLORIDE SERPL-SCNC: 106 MMOL/L (ref 98–107)
CHOLEST SERPL-MCNC: 255 MG/DL (ref 0–200)
CHOLEST/HDLC SERPL: 4.05 {RATIO}
CO2 SERPL-SCNC: 22.5 MMOL/L (ref 22–29)
CREAT SERPL-MCNC: 0.8 MG/DL (ref 0.57–1)
EGFRCR SERPLBLD CKD-EPI 2021: 86.6 ML/MIN/1.73
EOSINOPHIL # BLD AUTO: 0.2 10*3/MM3 (ref 0–0.4)
EOSINOPHIL NFR BLD AUTO: 4 % (ref 0.3–6.2)
ERYTHROCYTE [DISTWIDTH] IN BLOOD BY AUTOMATED COUNT: 12.8 % (ref 12.3–15.4)
GLOBULIN SER CALC-MCNC: 2.8 GM/DL
GLUCOSE SERPL-MCNC: 111 MG/DL (ref 65–99)
HBA1C MFR BLD: 5.9 % (ref 4.8–5.6)
HCT VFR BLD AUTO: 42.1 % (ref 34–46.6)
HCV IGG SERPL QL IA: NON REACTIVE
HDLC SERPL-MCNC: 63 MG/DL (ref 40–60)
HGB BLD-MCNC: 14.6 G/DL (ref 12–15.9)
IMM GRANULOCYTES # BLD AUTO: 0.01 10*3/MM3 (ref 0–0.05)
IMM GRANULOCYTES NFR BLD AUTO: 0.2 % (ref 0–0.5)
LDLC SERPL CALC-MCNC: 165 MG/DL (ref 0–100)
LYMPHOCYTES # BLD AUTO: 2.18 10*3/MM3 (ref 0.7–3.1)
LYMPHOCYTES NFR BLD AUTO: 43.5 % (ref 19.6–45.3)
MCH RBC QN AUTO: 31.8 PG (ref 26.6–33)
MCHC RBC AUTO-ENTMCNC: 34.7 G/DL (ref 31.5–35.7)
MCV RBC AUTO: 91.7 FL (ref 79–97)
MONOCYTES # BLD AUTO: 0.51 10*3/MM3 (ref 0.1–0.9)
MONOCYTES NFR BLD AUTO: 10.2 % (ref 5–12)
NEUTROPHILS # BLD AUTO: 2.07 10*3/MM3 (ref 1.7–7)
NEUTROPHILS NFR BLD AUTO: 41.3 % (ref 42.7–76)
NRBC BLD AUTO-RTO: 0 /100 WBC (ref 0–0.2)
PLATELET # BLD AUTO: 274 10*3/MM3 (ref 140–450)
POTASSIUM SERPL-SCNC: 4.4 MMOL/L (ref 3.5–5.2)
PROT SERPL-MCNC: 7.4 G/DL (ref 6–8.5)
RBC # BLD AUTO: 4.59 10*6/MM3 (ref 3.77–5.28)
SODIUM SERPL-SCNC: 141 MMOL/L (ref 136–145)
TRIGL SERPL-MCNC: 149 MG/DL (ref 0–150)
TSH SERPL DL<=0.005 MIU/L-ACNC: 1.44 UIU/ML (ref 0.27–4.2)
VLDLC SERPL CALC-MCNC: 27 MG/DL (ref 5–40)
WBC # BLD AUTO: 5.01 10*3/MM3 (ref 3.4–10.8)

## 2023-03-09 ENCOUNTER — TELEPHONE (OUTPATIENT)
Dept: INTERNAL MEDICINE | Facility: CLINIC | Age: 58
End: 2023-03-09
Payer: COMMERCIAL

## 2023-03-10 DIAGNOSIS — R73.03 PREDIABETES: ICD-10-CM

## 2023-03-10 DIAGNOSIS — E78.00 ELEVATED CHOLESTEROL: Primary | ICD-10-CM

## 2023-07-29 DIAGNOSIS — E66.9 CLASS 1 OBESITY WITHOUT SERIOUS COMORBIDITY WITH BODY MASS INDEX (BMI) OF 31.0 TO 31.9 IN ADULT, UNSPECIFIED OBESITY TYPE: ICD-10-CM

## 2023-07-31 RX ORDER — LIRAGLUTIDE 6 MG/ML
INJECTION, SOLUTION SUBCUTANEOUS
Refills: 3 | OUTPATIENT
Start: 2023-07-31

## 2023-08-03 ENCOUNTER — TELEPHONE (OUTPATIENT)
Dept: INTERNAL MEDICINE | Facility: CLINIC | Age: 58
End: 2023-08-03

## 2023-08-03 NOTE — TELEPHONE ENCOUNTER
Caller: [facility]    Relationship: Other    Best call back number: [phone]     What is the best time to reach you: ANYTIME    Who are you requesting to speak with (clinical staff, provider,  specific staff member): CLINICAL    What was the call regarding: PHARMACY IS NEEDING ADDITIONAL DOCUMENTATION OF THE 5% WEIGHT LOSS BEFORE AND AFTER TAKING THE MEDICATION.

## 2023-08-04 ENCOUNTER — TELEPHONE (OUTPATIENT)
Dept: INTERNAL MEDICINE | Facility: CLINIC | Age: 58
End: 2023-08-04
Payer: COMMERCIAL

## 2023-08-22 DIAGNOSIS — E66.9 CLASS 1 OBESITY WITHOUT SERIOUS COMORBIDITY WITH BODY MASS INDEX (BMI) OF 31.0 TO 31.9 IN ADULT, UNSPECIFIED OBESITY TYPE: ICD-10-CM

## 2023-08-28 DIAGNOSIS — E66.9 CLASS 1 OBESITY WITHOUT SERIOUS COMORBIDITY WITH BODY MASS INDEX (BMI) OF 31.0 TO 31.9 IN ADULT, UNSPECIFIED OBESITY TYPE: ICD-10-CM

## 2023-08-28 RX ORDER — LIRAGLUTIDE 6 MG/ML
2.4 INJECTION, SOLUTION SUBCUTANEOUS DAILY
Qty: 15 ML | Refills: 3 | Status: SHIPPED | OUTPATIENT
Start: 2023-08-28

## 2023-09-01 ENCOUNTER — TELEPHONE (OUTPATIENT)
Dept: INTERNAL MEDICINE | Facility: CLINIC | Age: 58
End: 2023-09-01
Payer: COMMERCIAL

## 2023-09-01 NOTE — TELEPHONE ENCOUNTER
PATIENT CALLED STATING THAT THE PHARMACY STILL HAS NOT RELEASED THE Liraglutide (Saxenda) 18 MG/3ML injection pen DUE TO NEEDING MORE INFORMATION FROM ROSARIO JOSEPH.    PATIENT WOULD LIKE TO CHECK THE STATUS OF THIS.    PLEASE ADVISE  [phone]     [id number] - [place], [place] - [address] AT [facility] - [phone] [facility] [phone]  [phone]

## 2023-09-07 NOTE — TELEPHONE ENCOUNTER
Pharmacy just needed to re-run the medication through insurance. Saxenda should be ready for  toady .

## 2023-09-29 ENCOUNTER — OFFICE VISIT (OUTPATIENT)
Dept: INTERNAL MEDICINE | Facility: CLINIC | Age: 58
End: 2023-09-29
Payer: COMMERCIAL

## 2023-09-29 VITALS
WEIGHT: 189.2 LBS | HEIGHT: 66 IN | DIASTOLIC BLOOD PRESSURE: 80 MMHG | SYSTOLIC BLOOD PRESSURE: 110 MMHG | OXYGEN SATURATION: 99 % | TEMPERATURE: 98 F | HEART RATE: 62 BPM | BODY MASS INDEX: 30.41 KG/M2

## 2023-09-29 DIAGNOSIS — Z00.00 HEALTH CARE MAINTENANCE: ICD-10-CM

## 2023-09-29 DIAGNOSIS — F41.9 ANXIETY: Primary | ICD-10-CM

## 2023-09-29 DIAGNOSIS — R41.840 ATTENTION DEFICIT: ICD-10-CM

## 2023-09-29 RX ORDER — ESCITALOPRAM OXALATE 5 MG/1
5 TABLET ORAL DAILY
Qty: 30 TABLET | Refills: 1 | Status: SHIPPED | OUTPATIENT
Start: 2023-09-29

## 2023-09-29 NOTE — PROGRESS NOTES
Subjective   Valerie Avina is a 58 y.o. female. Patient is here today for   Chief Complaint   Patient presents with    Depression     Patietn wants to discuss depression treatment option     ADHD     Patient says she has trouble focusing on everything, reading, staying on track in conversations,    .    History of Present Illness   Patient is here to discuss anxiety, trouble focusing. She retired a few months ago. Since then, she has had trouble focusing.   She feels Agitated all the time. Worrying all the time.  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  She has been on Wellbutrin since she was 41 (17 years) after having a complete hysterectomy.     She is on Progesterone cream/estrogen cream from [facility] pharmacy which helps with menopausal symptoms, such as hot flashes.     The following portions of the patient's history were reviewed and updated as appropriate: allergies, current medications, past family history, past medical history, past social history, past surgical history and problem list.    Review of Systems    Objective   Vitals:    09/29/23 0753   BP: 110/80   Pulse: 62   Temp: 98 °F (36.7 °C)   SpO2: 99%     Body mass index is 30.55 kg/m².  Physical Exam  Vitals and nursing note reviewed.   Constitutional:       Appearance: Normal appearance. She is well-developed.   Cardiovascular:      Rate and Rhythm: Normal rate and regular rhythm.      Heart sounds: Normal heart sounds.   Pulmonary:      Effort: Pulmonary effort is normal.      Breath sounds: Normal breath sounds.   Skin:     General: Skin is warm and dry.   Neurological:      Mental Status: She is alert and oriented to person, place, and time.   Psychiatric:         Speech: Speech normal.         Behavior: Behavior normal.         Thought Content: Thought content normal.       Assessment & Plan   Diagnoses and all orders for this visit:    1. Anxiety (Primary)  -     escitalopram (Lexapro) 5 MG tablet; Take 1 tablet by  mouth Daily.  Dispense: 30 tablet; Refill: 1    2. Attention deficit  -     Ambulatory Referral to Neuropsychology      Anxiety - patient will be started on lexapro 5 mg daily. Can increase to 10 mg if needed. She has a follow up appt in a month. She will also look into counseling    Attention deficit - will refer to neuropsych.  Patient knows that it may take a few months to get into an appointment.  This may also just be due to her recent life changes with retiring, but will refer for formal testing.         Answers submitted by the patient for this visit:  Other (Submitted on 9/28/2023)  Please describe your symptoms.: Trouble focusing.   Waivering events of depression  Have you had these symptoms before?: No  How long have you been having these symptoms?: Greater than 2 weeks  Please list any medications you are currently taking for this condition.: None.   Wellbutrin ( started at 41 years old due to full hysterectomy )  Please describe any probable cause for these symptoms. : Trouble maintaing focus   Trouble with reading and  comprehension.  Anxiousness  Primary Reason for Visit (Submitted on 9/28/2023)  What is the primary reason for your visit?: Other

## 2023-11-08 DIAGNOSIS — F41.9 ANXIETY: ICD-10-CM

## 2023-11-08 RX ORDER — ESCITALOPRAM OXALATE 5 MG/1
5 TABLET ORAL DAILY
Qty: 90 TABLET | Refills: 1 | Status: SHIPPED | OUTPATIENT
Start: 2023-11-08

## 2023-11-16 ENCOUNTER — OFFICE VISIT (OUTPATIENT)
Dept: INTERNAL MEDICINE | Facility: CLINIC | Age: 58
End: 2023-11-16
Payer: COMMERCIAL

## 2023-11-16 VITALS
WEIGHT: 191.6 LBS | HEART RATE: 73 BPM | TEMPERATURE: 97.8 F | HEIGHT: 66 IN | BODY MASS INDEX: 30.79 KG/M2 | SYSTOLIC BLOOD PRESSURE: 116 MMHG | OXYGEN SATURATION: 99 % | DIASTOLIC BLOOD PRESSURE: 72 MMHG

## 2023-11-16 DIAGNOSIS — F41.9 ANXIETY: ICD-10-CM

## 2023-11-16 DIAGNOSIS — E66.9 CLASS 1 OBESITY WITHOUT SERIOUS COMORBIDITY WITH BODY MASS INDEX (BMI) OF 31.0 TO 31.9 IN ADULT, UNSPECIFIED OBESITY TYPE: ICD-10-CM

## 2023-11-16 DIAGNOSIS — R73.03 PREDIABETES: Primary | ICD-10-CM

## 2023-11-16 DIAGNOSIS — E78.2 MODERATE MIXED HYPERLIPIDEMIA NOT REQUIRING STATIN THERAPY: ICD-10-CM

## 2023-11-16 PROCEDURE — 99214 OFFICE O/P EST MOD 30 MIN: CPT | Performed by: NURSE PRACTITIONER

## 2023-11-16 RX ORDER — LIRAGLUTIDE 6 MG/ML
0.6 INJECTION, SOLUTION SUBCUTANEOUS DAILY
Qty: 15 ML | Refills: 3 | Status: SHIPPED | OUTPATIENT
Start: 2023-11-16

## 2023-11-16 NOTE — PROGRESS NOTES
Subjective   Valerie Avina is a 58 y.o. female. Patient is here today for   Chief Complaint   Patient presents with    Anxiety    Hyperlipidemia    Prediabetes    Med Management     Saxenda f/u - Hasn't been able to get medication in months   .    History of Present Illness   Patient is here to follow up on anxiety. She is feeling better on lexapro.     Patient is here to follow up on hyperlipidemia. She does take flax seed daily.     Patient is also here to follow up on prediabetes and obesity. She was on saxenda, but hasn't been able to fill it since July due to supply issues.     The following portions of the patient's history were reviewed and updated as appropriate: allergies, current medications, past family history, past medical history, past social history, past surgical history and problem list.    Review of Systems    Objective     Vitals:    11/16/23 0805   BP: 116/72   Pulse: 73   Temp: 97.8 °F (36.6 °C)   SpO2: 99%     Body mass index is 30.94 kg/m².    Results Encounter on 11/09/2023   Component Date Value Ref Range Status    Glucose 11/07/2023 125 (H)  65 - 99 mg/dL Final    BUN 11/07/2023 17  6 - 20 mg/dL Final    Creatinine 11/07/2023 0.67  0.57 - 1.00 mg/dL Final    EGFR Result 11/07/2023 102.1  >60.0 mL/min/1.73 Final    Comment: GFR Normal >60  Chronic Kidney Disease <60  Kidney Failure <15      BUN/Creatinine Ratio 11/07/2023 25.4 (H)  7.0 - 25.0 Final    Sodium 11/07/2023 143  136 - 145 mmol/L Final    Potassium 11/07/2023 4.3  3.5 - 5.2 mmol/L Final    Chloride 11/07/2023 106  98 - 107 mmol/L Final    Total CO2 11/07/2023 25.3  22.0 - 29.0 mmol/L Final    Calcium 11/07/2023 9.2  8.6 - 10.5 mg/dL Final    Total Protein 11/07/2023 6.8  6.0 - 8.5 g/dL Final    Albumin 11/07/2023 4.3  3.5 - 5.2 g/dL Final    Globulin 11/07/2023 2.5  gm/dL Final    A/G Ratio 11/07/2023 1.7  g/dL Final    Total Bilirubin 11/07/2023 0.3  0.0 - 1.2 mg/dL Final    Alkaline Phosphatase 11/07/2023 64  39 - 117 U/L  Final    AST (SGOT) 11/07/2023 19  1 - 32 U/L Final    ALT (SGPT) 11/07/2023 29  1 - 33 U/L Final    Hemoglobin A1C 11/07/2023 5.90 (H)  4.80 - 5.60 % Final    Comment: Hemoglobin A1C Ranges:  Increased Risk for Diabetes  5.7% to 6.4%  Diabetes                     >= 6.5%  Diabetic Goal                < 7.0%      Total Cholesterol 11/07/2023 247 (H)  0 - 200 mg/dL Final    Comment: Cholesterol Reference Ranges  (U.S. Department of Health and Human Services ATP III  Classifications)  Desirable          <200 mg/dL  Borderline High    200-239 mg/dL  High Risk          >240 mg/dL  Triglyceride Reference Ranges  (U.S. Department of Health and Human Services ATP III  Classifications)  Normal           <150 mg/dL  Borderline High  150-199 mg/dL  High             200-499 mg/dL  Very High        >500 mg/dL  HDL Reference Ranges  (U.S. Department of Health and Human Services ATP III  Classifications)  Low     <40 mg/dl (major risk factor for CHD)  High    >60 mg/dl ('negative' risk factor for CHD)  LDL Reference Ranges  (U.S. Department of Health and Human Services ATP III  Classifications)  Optimal          <100 mg/dL  Near Optimal     100-129 mg/dL  Borderline High  130-159 mg/dL  High             160-189 mg/dL  Very High        >189 mg/dL      Triglycerides 11/07/2023 147  0 - 150 mg/dL Final    HDL Cholesterol 11/07/2023 52  40 - 60 mg/dL Final    VLDL Cholesterol Braulio 11/07/2023 27  5 - 40 mg/dL Final    LDL Chol Calc (NIH) 11/07/2023 168 (H)  0 - 100 mg/dL Final    Chol/HDL Ratio 11/07/2023 4.75   Final     Reviewed labs with patient.     Physical Exam  Vitals and nursing note reviewed.   Constitutional:       Appearance: Normal appearance. She is well-developed.   Cardiovascular:      Rate and Rhythm: Normal rate and regular rhythm.      Heart sounds: Normal heart sounds.   Pulmonary:      Effort: Pulmonary effort is normal.      Breath sounds: Normal breath sounds.   Skin:     General: Skin is warm and dry.    Neurological:      Mental Status: She is alert and oriented to person, place, and time.   Psychiatric:         Speech: Speech normal.         Behavior: Behavior normal.         Thought Content: Thought content normal.         Assessment & Plan   Diagnoses and all orders for this visit:    1. Prediabetes (Primary)    2. Class 1 obesity without serious comorbidity with body mass index (BMI) of 31.0 to 31.9 in adult, unspecified obesity type  -     Liraglutide (Saxenda) 18 MG/3ML injection pen; Inject 0.6 mg under the skin into the appropriate area as directed Daily. 0.6 mg daily x 1 week, then increase to 1.2 mg daily x 1 week, increase 0.6 mg weekly up to 3 mg  Dispense: 15 mL; Refill: 3    3. Moderate mixed hyperlipidemia not requiring statin therapy    4. Anxiety    Prediabetes - patient's HgbA1C has increased slightly. Discussed dietary changes.     Obesity - will restart Saxenda. Script sent to different pharmacy.     HLD - this has improved slightly. I have used a decision aid to share decision making with the patient about interventions to reduce the risk of coronary events. We estimated the patient's 10-year of atherosclerotic events at 3% and discussed how this risk could be reduced with the use of statins to 2%. After considering the patient's unique circumstances and the pros and cons of the alternatives, we have decided to continue with lifestyle changes.    Anxiety - continue bupropion  mg daily and lexapro 5 mg daily.     F/u in 6 months for a physical with fasting labs prior             Current Outpatient Medications:     buPROPion XL (WELLBUTRIN XL) 300 MG 24 hr tablet, Take 1 tablet by mouth Daily., Disp: , Rfl:     escitalopram (Lexapro) 5 MG tablet, Take 1 tablet by mouth Daily., Disp: 90 tablet, Rfl: 1    melatonin 5 MG tablet tablet, Take 1 tablet by mouth At Night As Needed., Disp: , Rfl:     Omega-3 Fatty Acids (FISH OIL) 1000 MG capsule capsule, Take  by mouth Daily With Breakfast.,  Disp: , Rfl:     zolpidem (AMBIEN) 10 MG tablet, Take 1 tablet by mouth At Night As Needed., Disp: , Rfl:     BD Pen Needle Miranda 2nd Gen 32G X 4 MM misc, See Admin Instructions. (Patient not taking: Reported on 11/16/2023), Disp: , Rfl:     Liraglutide (Saxenda) 18 MG/3ML injection pen, Inject 2.4 mg under the skin into the appropriate area as directed Daily. (Patient not taking: Reported on 11/16/2023), Disp: 15 mL, Rfl: 3

## 2023-12-05 ENCOUNTER — OFFICE VISIT (OUTPATIENT)
Dept: INTERNAL MEDICINE | Facility: CLINIC | Age: 58
End: 2023-12-05
Payer: COMMERCIAL

## 2023-12-05 VITALS
DIASTOLIC BLOOD PRESSURE: 80 MMHG | WEIGHT: 189 LBS | OXYGEN SATURATION: 98 % | TEMPERATURE: 98 F | HEIGHT: 66 IN | BODY MASS INDEX: 30.37 KG/M2 | HEART RATE: 80 BPM | SYSTOLIC BLOOD PRESSURE: 118 MMHG

## 2023-12-05 DIAGNOSIS — R06.83 SNORING: Primary | ICD-10-CM

## 2023-12-05 PROCEDURE — 99212 OFFICE O/P EST SF 10 MIN: CPT | Performed by: NURSE PRACTITIONER

## 2023-12-05 NOTE — PROGRESS NOTES
Subjective   Valerie Avina is a 58 y.o. female. Patient is here today for   Chief Complaint   Patient presents with    Sleep Apnea     Patient is requesting a sleep study, her family complains she is snoring loudly, do nt know weather patient has stopped breathing in sleep or not.    .    History of Present Illness   Patient is requesting a sleep study. Her family states that she snores loudly. States that she does feel rested when she wakes up    The following portions of the patient's history were reviewed and updated as appropriate: allergies, current medications, past family history, past medical history, past social history, past surgical history and problem list.    Review of Systems    Objective   Vitals:    12/05/23 1121   BP: 118/80   Pulse: 80   Temp: 98 °F (36.7 °C)   SpO2: 98%     Body mass index is 30.52 kg/m².  Physical Exam  Vitals and nursing note reviewed.   Constitutional:       Appearance: Normal appearance. She is well-developed.   Cardiovascular:      Rate and Rhythm: Normal rate.   Pulmonary:      Effort: Pulmonary effort is normal.   Neurological:      Mental Status: She is alert and oriented to person, place, and time.   Psychiatric:         Speech: Speech normal.         Behavior: Behavior normal.         Thought Content: Thought content normal.         Assessment & Plan   Diagnoses and all orders for this visit:    1. Snoring (Primary)  -     Ambulatory Referral to Sleep Medicine      Patient will be referred to sleep medicine.

## 2024-01-05 ENCOUNTER — OFFICE VISIT (OUTPATIENT)
Dept: SLEEP MEDICINE | Facility: HOSPITAL | Age: 59
End: 2024-01-05
Payer: COMMERCIAL

## 2024-01-05 VITALS
HEART RATE: 82 BPM | HEIGHT: 66 IN | SYSTOLIC BLOOD PRESSURE: 148 MMHG | BODY MASS INDEX: 31.6 KG/M2 | DIASTOLIC BLOOD PRESSURE: 84 MMHG | WEIGHT: 196.6 LBS | OXYGEN SATURATION: 96 %

## 2024-01-05 DIAGNOSIS — R06.83 SNORING: Primary | ICD-10-CM

## 2024-01-05 DIAGNOSIS — G47.10 HYPERSOMNIA: ICD-10-CM

## 2024-01-05 DIAGNOSIS — R06.89 GASPING FOR BREATH: ICD-10-CM

## 2024-01-05 PROCEDURE — G0463 HOSPITAL OUTPT CLINIC VISIT: HCPCS

## 2024-01-05 NOTE — PROGRESS NOTES
[facility]  Telephone: [phone] / Fax: [phone] [place]  Telephone: [phone] / Fax: [phone] Kellie Frazier    Referring Physician: Laura Garcia APRN  PCP: Laura Garcia APRN    Reason for consult:  sleep apnea    Valerie Aivna is a 58 y.o.female  was seen in the [facility] today for evaluation of sleep apnea.  She reports loud snoring since March 2023 after getting invisilign braces and using a retainer. Snoring occurs in all positions.  She wakes up coughing/choking. Her sleep schedule is 10pm-7am. She takes daily naps, usually lasting 1.5 hours. They don't seem to interfere with falling asleep at night. She has 10mg Zolpidem prescribed.  She takes it PRN(1/2 of it). No prior MAUREEN evaluation to date has been performed. She gained a little bit of weight over the years.    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    ROS-negative    Valerie Avina  has a past medical history of Ankle sprain (2weeks ago), Anxiety, Depression, Fracture of wrist (20 years ago), Insomnia, Sleep difficulties, and Tear of meniscus of knee (10 years ago).    Current Medications:    Current Outpatient Medications:     BD Pen Needle Miranda 2nd Gen 32G X 4 MM misc, See Admin Instructions., Disp: , Rfl:     buPROPion XL (WELLBUTRIN XL) 300 MG 24 hr tablet, Take 1 tablet by mouth Daily., Disp: , Rfl:     escitalopram (Lexapro) 5 MG tablet, Take 1 tablet by mouth Daily., Disp: 90 tablet, Rfl: 1    Liraglutide (Saxenda) 18 MG/3ML injection pen, Inject 0.6 mg under the skin into the appropriate area as directed Daily. 0.6 mg daily x 1 week, then increase to 1.2 mg daily x 1 week, increase 0.6 mg weekly up to 3 mg, Disp: 15 mL, Rfl: 3    melatonin 5 MG tablet tablet, Take 1 tablet by mouth At Night As Needed., Disp: , Rfl:     Omega-3 Fatty Acids (FISH OIL) 1000 MG capsule capsule, Take  by mouth Daily With Breakfast., Disp: , Rfl:     zolpidem (AMBIEN) 10 MG tablet, Take 1  "tablet by mouth At Night As Needed., Disp: , Rfl:     I have reviewed Past Medical History, Past Surgical History, Medication List, Social History and Family History as entered in Sleep Questionnaire and EPIC.    ESS  3   Vital Signs /84   Pulse 82   Ht 167.6 cm (65.98\")   Wt 89.2 kg (196 lb 9.6 oz)   SpO2 96%   BMI 31.75 kg/m²  Body mass index is 31.75 kg/m².    General Alert and oriented. No acute distress noted   Pharynx/Throat Class IV Mallampati airway, large tongue, no evidence of redundant lateral pharyngeal tissue. No oral lesions. No thrush. Moist mucous membranes.   Head Normocephalic. Symmetrical. Atraumatic.    Nose No septal deviation. No drainage   Chest Wall Normal shape. Symmetric expansion with respiration. No tenderness.   Neck Trachea midline, no thyromegaly or adenopathy    Lungs Clear to auscultation bilaterally. No wheezes. No rhonchi. No rales. Respirations regular, even and unlabored.   Heart Regular rhythm and normal rate. Normal S1 and S2. No murmur   Abdomen Soft, non-tender and non-distended. Normal bowel sounds. No masses.   Extremities Moves all extremities well. No edema   Psychiatric Normal mood and affect.        Impression:  1. Snoring    2. Hypersomnia    3. Gasping for breath          Plan:  I discussed the pathophysiology of obstructive sleep apnea with the patient.  We discussed the adverse outcomes associated with untreated sleep-disordered breathing.  We discussed treatment modalities of obstructive sleep apnea including CPAP device. Sleep study will be scheduled to establish a definitive diagnosis of sleep disorder breathing.  Weight loss will be strongly beneficial in order to reduce the severity of sleep-disordered breathing.  Patient has narrow oropharyngeal structure.  Caution during activities that require prolonged concentration is strongly advised.  After sleep study results are available, patient will be notified, and appointment will be scheduled to " discuss sleep study results and treatment recommendations.    I appreciate the opportunity to participate in this patient's care.      ROSARIO Roche  [facility]  Phone: [phone]      Part of this note may be an electronic transcription/translation of spoken language to printed text using the Dragon Dictation System. Some errors may exist even though the document was edited.

## 2024-01-15 ENCOUNTER — HOSPITAL ENCOUNTER (OUTPATIENT)
Dept: SLEEP MEDICINE | Facility: HOSPITAL | Age: 59
Discharge: HOME | End: 2024-01-15
Admitting: NURSE PRACTITIONER
Payer: COMMERCIAL

## 2024-01-15 DIAGNOSIS — R06.89 GASPING FOR BREATH: ICD-10-CM

## 2024-01-15 DIAGNOSIS — G47.10 HYPERSOMNIA: ICD-10-CM

## 2024-01-15 DIAGNOSIS — R06.83 SNORING: ICD-10-CM

## 2024-01-15 PROCEDURE — 95806 SLEEP STUDY UNATT&RESP EFFT: CPT

## 2024-01-23 DIAGNOSIS — G47.33 OBSTRUCTIVE SLEEP APNEA: Primary | ICD-10-CM

## 2024-01-24 ENCOUNTER — TELEPHONE (OUTPATIENT)
Dept: SLEEP MEDICINE | Facility: HOSPITAL | Age: 59
End: 2024-01-24
Payer: COMMERCIAL

## 2024-02-19 DIAGNOSIS — F41.9 ANXIETY: ICD-10-CM

## 2024-02-19 RX ORDER — ESCITALOPRAM OXALATE 5 MG/1
5 TABLET ORAL DAILY
Qty: 90 TABLET | Refills: 1 | Status: SHIPPED | OUTPATIENT
Start: 2024-02-19

## 2024-05-09 DIAGNOSIS — Z00.00 HEALTHCARE MAINTENANCE: Primary | ICD-10-CM

## 2024-05-10 LAB
25(OH)D3+25(OH)D2 SERPL-MCNC: 82.3 NG/ML (ref 30–100)
ALBUMIN SERPL-MCNC: 4.3 G/DL (ref 3.5–5.2)
ALBUMIN/GLOB SERPL: 1.7 G/DL
ALP SERPL-CCNC: 69 U/L (ref 39–117)
ALT SERPL-CCNC: 24 U/L (ref 1–33)
AST SERPL-CCNC: 20 U/L (ref 1–32)
BASOPHILS # BLD AUTO: 0.03 10*3/MM3 (ref 0–0.2)
BASOPHILS NFR BLD AUTO: 0.6 % (ref 0–1.5)
BILIRUB SERPL-MCNC: <0.2 MG/DL (ref 0–1.2)
BUN SERPL-MCNC: 13 MG/DL (ref 6–20)
BUN/CREAT SERPL: 18.8 (ref 7–25)
CALCIUM SERPL-MCNC: 9.1 MG/DL (ref 8.6–10.5)
CHLORIDE SERPL-SCNC: 108 MMOL/L (ref 98–107)
CHOLEST SERPL-MCNC: 223 MG/DL (ref 0–200)
CO2 SERPL-SCNC: 24.7 MMOL/L (ref 22–29)
CREAT SERPL-MCNC: 0.69 MG/DL (ref 0.57–1)
EGFRCR SERPLBLD CKD-EPI 2021: 101.4 ML/MIN/1.73
EOSINOPHIL # BLD AUTO: 0.15 10*3/MM3 (ref 0–0.4)
EOSINOPHIL NFR BLD AUTO: 3 % (ref 0.3–6.2)
ERYTHROCYTE [DISTWIDTH] IN BLOOD BY AUTOMATED COUNT: 12.8 % (ref 12.3–15.4)
GLOBULIN SER CALC-MCNC: 2.6 GM/DL
GLUCOSE SERPL-MCNC: 100 MG/DL (ref 65–99)
HBA1C MFR BLD: 5.8 % (ref 4.8–5.6)
HCT VFR BLD AUTO: 40.5 % (ref 34–46.6)
HDLC SERPL-MCNC: 55 MG/DL (ref 40–60)
HGB BLD-MCNC: 14.1 G/DL (ref 12–15.9)
IMM GRANULOCYTES # BLD AUTO: 0.01 10*3/MM3 (ref 0–0.05)
IMM GRANULOCYTES NFR BLD AUTO: 0.2 % (ref 0–0.5)
LDLC SERPL CALC-MCNC: 128 MG/DL (ref 0–100)
LDLC/HDLC SERPL: 2.23 {RATIO}
LYMPHOCYTES # BLD AUTO: 2.02 10*3/MM3 (ref 0.7–3.1)
LYMPHOCYTES NFR BLD AUTO: 40.8 % (ref 19.6–45.3)
MCH RBC QN AUTO: 33.2 PG (ref 26.6–33)
MCHC RBC AUTO-ENTMCNC: 34.8 G/DL (ref 31.5–35.7)
MCV RBC AUTO: 95.3 FL (ref 79–97)
MONOCYTES # BLD AUTO: 0.51 10*3/MM3 (ref 0.1–0.9)
MONOCYTES NFR BLD AUTO: 10.3 % (ref 5–12)
NEUTROPHILS # BLD AUTO: 2.23 10*3/MM3 (ref 1.7–7)
NEUTROPHILS NFR BLD AUTO: 45.1 % (ref 42.7–76)
NRBC BLD AUTO-RTO: 0 /100 WBC (ref 0–0.2)
PLATELET # BLD AUTO: 245 10*3/MM3 (ref 140–450)
POTASSIUM SERPL-SCNC: 4.5 MMOL/L (ref 3.5–5.2)
PROT SERPL-MCNC: 6.9 G/DL (ref 6–8.5)
RBC # BLD AUTO: 4.25 10*6/MM3 (ref 3.77–5.28)
SODIUM SERPL-SCNC: 144 MMOL/L (ref 136–145)
TRIGL SERPL-MCNC: 226 MG/DL (ref 0–150)
TSH SERPL DL<=0.005 MIU/L-ACNC: 1.46 UIU/ML (ref 0.27–4.2)
VLDLC SERPL CALC-MCNC: 40 MG/DL (ref 5–40)
WBC # BLD AUTO: 4.95 10*3/MM3 (ref 3.4–10.8)

## 2024-05-16 ENCOUNTER — OFFICE VISIT (OUTPATIENT)
Dept: INTERNAL MEDICINE | Facility: CLINIC | Age: 59
End: 2024-05-16
Payer: COMMERCIAL

## 2024-05-16 VITALS
WEIGHT: 197 LBS | TEMPERATURE: 97.6 F | OXYGEN SATURATION: 98 % | DIASTOLIC BLOOD PRESSURE: 70 MMHG | BODY MASS INDEX: 31.66 KG/M2 | SYSTOLIC BLOOD PRESSURE: 118 MMHG | HEART RATE: 64 BPM | HEIGHT: 66 IN

## 2024-05-16 DIAGNOSIS — F41.9 ANXIETY: ICD-10-CM

## 2024-05-16 DIAGNOSIS — Z00.00 HEALTHCARE MAINTENANCE: Primary | ICD-10-CM

## 2024-05-16 DIAGNOSIS — R73.03 PREDIABETES: ICD-10-CM

## 2024-05-16 PROCEDURE — 99396 PREV VISIT EST AGE 40-64: CPT | Performed by: NURSE PRACTITIONER

## 2024-05-16 RX ORDER — ESCITALOPRAM OXALATE 10 MG/1
10 TABLET ORAL DAILY
Qty: 90 TABLET | Refills: 3 | Status: SHIPPED | OUTPATIENT
Start: 2024-05-16

## 2024-05-16 NOTE — PROGRESS NOTES
"Subjective   Valerie Avina is a 59 y.o. female and is here for a comprehensive physical exam. The patient reports no problems.    Do you take any herbs or supplements that were not prescribed by a doctor?  Vit D, calcium    She has started using a CPAP machine and is sleeping better and feels more rested.     She is here to follow up on anxiety.  She has been on Lexapro 5 mg daily and has been doing well, but feels like she may need to increase the dosage.  Patient is on Wellbutrin daily which is prescribed by her gynecologist.  This was prescribed when she had her hysterectomy.  She also uses Ambien on a rare occasion and this was prescribed by her gynecologist also     History:  LMP: No LMP recorded. Patient has had a hysterectomy.  Patient sees gynecology    The following portions of the patient's history were reviewed and updated as appropriate: allergies, current medications, past family history, past medical history, past social history, past surgical history and problem list.    Review of Systems  Do you have pain that bothers you in your daily life? no  A comprehensive review of systems was negative.    Objective   /70   Pulse 64   Temp 97.6 °F (36.4 °C)   Ht 167.6 cm (65.98\")   Wt 89.4 kg (197 lb)   SpO2 98%   BMI 31.81 kg/m²     General Appearance:    Alert, cooperative, no distress, appears stated age   Head:    Normocephalic, without obvious abnormality, atraumatic   Eyes:    PERRL, conjunctiva/corneas clear, EOM's intact, both eyes   Ears:    Normal TM's and external ear canals, both ears   Nose:   Nares normal, septum midline, mucosa normal, no drainage    or sinus tenderness   Throat:   Lips, mucosa, and tongue normal; teeth and gums normal   Neck:   Supple, symmetrical, trachea midline, no adenopathy;     thyroid:  no enlargement/tenderness/nodules; no carotid    bruit   Back:     Symmetric, no curvature, ROM normal, no CVA tenderness   Lungs:     Clear to auscultation bilaterally, " respirations unlabored   Chest Wall:    No tenderness or deformity    Heart:    Regular rate and rhythm, S1 and S2 normal, no murmur       Abdomen:     Soft, non-tender, bowel sounds active all four quadrants,     no masses, no organomegaly           Extremities:   Extremities normal, atraumatic, no cyanosis or edema   Pulses:   2+ and symmetric all extremities   Skin:   Skin color, texture, turgor normal, no rashes or lesions   Lymph nodes:   Cervical, supraclavicular, and axillary nodes normal   Neurologic:   Grossly intact, normal strength, sensation and reflexes     throughout     Orders Only on 05/09/2024   Component Date Value Ref Range Status    Total Cholesterol 05/09/2024 223 (H)  0 - 200 mg/dL Final    Comment: Cholesterol Reference Ranges  (U.S. Department of Health and Human Services ATP III  Classifications)  Desirable          <200 mg/dL  Borderline High    200-239 mg/dL  High Risk          >240 mg/dL  Triglyceride Reference Ranges  (U.S. Department of Health and Human Services ATP III  Classifications)  Normal           <150 mg/dL  Borderline High  150-199 mg/dL  High             200-499 mg/dL  Very High        >500 mg/dL  HDL Reference Ranges  (U.S. Department of Health and Human Services ATP III  Classifications)  Low     <40 mg/dl (major risk factor for CHD)  High    >60 mg/dl ('negative' risk factor for CHD)  LDL Reference Ranges  (U.S. Department of Health and Human Services ATP III  Classifications)  Optimal          <100 mg/dL  Near Optimal     100-129 mg/dL  Borderline High  130-159 mg/dL  High             160-189 mg/dL  Very High        >189 mg/dL      Triglycerides 05/09/2024 226 (H)  0 - 150 mg/dL Final    HDL Cholesterol 05/09/2024 55  40 - 60 mg/dL Final    VLDL Cholesterol Braulio 05/09/2024 40  5 - 40 mg/dL Final    LDL Chol Calc (NIH) 05/09/2024 128 (H)  0 - 100 mg/dL Final    LDL/HDL RATIO 05/09/2024 2.23   Final    Hemoglobin A1C 05/09/2024 5.80 (H)  4.80 - 5.60 % Final    Comment:  Hemoglobin A1C Ranges:  Increased Risk for Diabetes  5.7% to 6.4%  Diabetes                     >= 6.5%  Diabetic Goal                < 7.0%      Glucose 05/09/2024 100 (H)  65 - 99 mg/dL Final    BUN 05/09/2024 13  6 - 20 mg/dL Final    Creatinine 05/09/2024 0.69  0.57 - 1.00 mg/dL Final    EGFR Result 05/09/2024 101.4  >60.0 mL/min/1.73 Final    Comment: GFR Normal >60  Chronic Kidney Disease <60  Kidney Failure <15      BUN/Creatinine Ratio 05/09/2024 18.8  7.0 - 25.0 Final    Sodium 05/09/2024 144  136 - 145 mmol/L Final    Potassium 05/09/2024 4.5  3.5 - 5.2 mmol/L Final    Chloride 05/09/2024 108 (H)  98 - 107 mmol/L Final    Total CO2 05/09/2024 24.7  22.0 - 29.0 mmol/L Final    Calcium 05/09/2024 9.1  8.6 - 10.5 mg/dL Final    Total Protein 05/09/2024 6.9  6.0 - 8.5 g/dL Final    Albumin 05/09/2024 4.3  3.5 - 5.2 g/dL Final    Globulin 05/09/2024 2.6  gm/dL Final    A/G Ratio 05/09/2024 1.7  g/dL Final    Total Bilirubin 05/09/2024 <0.2  0.0 - 1.2 mg/dL Final    Alkaline Phosphatase 05/09/2024 69  39 - 117 U/L Final    AST (SGOT) 05/09/2024 20  1 - 32 U/L Final    ALT (SGPT) 05/09/2024 24  1 - 33 U/L Final    WBC 05/09/2024 4.95  3.40 - 10.80 10*3/mm3 Final    RBC 05/09/2024 4.25  3.77 - 5.28 10*6/mm3 Final    Hemoglobin 05/09/2024 14.1  12.0 - 15.9 g/dL Final    Hematocrit 05/09/2024 40.5  34.0 - 46.6 % Final    MCV 05/09/2024 95.3  79.0 - 97.0 fL Final    MCH 05/09/2024 33.2 (H)  26.6 - 33.0 pg Final    MCHC 05/09/2024 34.8  31.5 - 35.7 g/dL Final    RDW 05/09/2024 12.8  12.3 - 15.4 % Final    Platelets 05/09/2024 245  140 - 450 10*3/mm3 Final    Neutrophil Rel % 05/09/2024 45.1  42.7 - 76.0 % Final    Lymphocyte Rel % 05/09/2024 40.8  19.6 - 45.3 % Final    Monocyte Rel % 05/09/2024 10.3  5.0 - 12.0 % Final    Eosinophil Rel % 05/09/2024 3.0  0.3 - 6.2 % Final    Basophil Rel % 05/09/2024 0.6  0.0 - 1.5 % Final    Neutrophils Absolute 05/09/2024 2.23  1.70 - 7.00 10*3/mm3 Final    Lymphocytes Absolute  05/09/2024 2.02  0.70 - 3.10 10*3/mm3 Final    Monocytes Absolute 05/09/2024 0.51  0.10 - 0.90 10*3/mm3 Final    Eosinophils Absolute 05/09/2024 0.15  0.00 - 0.40 10*3/mm3 Final    Basophils Absolute 05/09/2024 0.03  0.00 - 0.20 10*3/mm3 Final    Immature Granulocyte Rel % 05/09/2024 0.2  0.0 - 0.5 % Final    Immature Grans Absolute 05/09/2024 0.01  0.00 - 0.05 10*3/mm3 Final    nRBC 05/09/2024 0.0  0.0 - 0.2 /100 WBC Final    TSH 05/09/2024 1.460  0.270 - 4.200 uIU/mL Final    25 Hydroxy, Vitamin D 05/09/2024 82.3  30.0 - 100.0 ng/ml Final    Comment: Reference Range for Total Vitamin D 25(OH)  Deficiency <20.0 ng/mL  Insufficiency 21-29 ng/mL  Sufficiency  ng/mL  Toxicity >100 ng/ml       Reviewed labs with patient.        Assessment & Plan   Healthy female exam.      1. Diagnoses and all orders for this visit:    1. Healthcare maintenance (Primary)  -     Lipid Panel With / Chol / HDL Ratio; Future    2. Anxiety  -     escitalopram (LEXAPRO) 10 MG tablet; Take 1 tablet by mouth Daily.  Dispense: 90 tablet; Refill: 3    3. Prediabetes  -     Comprehensive Metabolic Panel; Future  -     Hemoglobin A1c; Future      Anxiety - will increase lexapro to 10 mg daily.     Prediabetes - discussed decreasing carbs and sugars    2. Patient Counseling:  --Nutrition: Stressed importance of moderation in sodium/caffeine intake, saturated fat and cholesterol, caloric balance, sufficient intake of fresh fruits, vegetables, fiber, calcium, iron.  --Exercise: Stressed the importance of regular exercise.    --Dental health: Discussed importance of regular tooth brushing, flossing, and dental visits.  --Immunizations reviewed. Had Tdap about 4 years ago    3. Discussed the patient's BMI with her.  The BMI is above average; BMI management plan is completed        4. Follow up  in 7 months with fasting labs prior

## 2024-05-29 DIAGNOSIS — E66.9 CLASS 1 OBESITY WITHOUT SERIOUS COMORBIDITY WITH BODY MASS INDEX (BMI) OF 31.0 TO 31.9 IN ADULT, UNSPECIFIED OBESITY TYPE: ICD-10-CM

## 2024-05-29 RX ORDER — LIRAGLUTIDE 6 MG/ML
0.6 INJECTION, SOLUTION SUBCUTANEOUS DAILY
Refills: 3 | OUTPATIENT
Start: 2024-05-29

## 2024-06-18 NOTE — PROGRESS NOTES
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  She states she was walking her dog middle of May that dog lunged at another dog and then actually hit her in the anterior aspect of her knee it sounds like it was more of a hyperextension injury.  She states she had severe pain she had some swelling some night pain she states its bothered her every day it has been about the same she tried to play golf the other day and had severe pain after that she also has swelling she is done anti-inflammatories ice rest stretching and strengthening all with no lasting improvement      Allergies: No Known Allergies    Medications:   Home Medications:  Current Outpatient Medications on File Prior to Visit   Medication Sig    buPROPion XL (WELLBUTRIN XL) 300 MG 24 hr tablet Take 1 tablet by mouth Daily.    escitalopram (LEXAPRO) 10 MG tablet Take 1 tablet by mouth Daily.    melatonin 5 MG tablet tablet Take 1 tablet by mouth At Night As Needed.    Omega-3 Fatty Acids (FISH OIL) 1000 MG capsule capsule Take  by mouth Daily With Breakfast.    BD Pen Needle Miranda 2nd Gen 32G X 4 MM misc See Admin Instructions.    zolpidem (AMBIEN) 10 MG tablet Take 1 tablet by mouth At Night As Needed. (Patient not taking: Reported on 6/21/2024)     No current facility-administered medications on file prior to visit.     Current Medications:  Scheduled Meds:  Continuous Infusions:No current facility-administered medications for this visit.    PRN Meds:.    Past Medical History:   Diagnosis Date    Ankle sprain 2weeks ago    Anxiety     Depression     Fracture of ankle 2022    Fracture of wrist 20 years ago    Fracture, foot 2022    Insomnia     Sleep difficulties     Tear of meniscus of knee 10 years ago        Past Surgical  "History:   Procedure Laterality Date    ANKLE OPEN REDUCTION INTERNAL FIXATION  2022    COLONOSCOPY N/A 05/16/2022    Procedure: COLONOSCOPY;  Surgeon: Khadra Sanchez MD;  [place] [facility] [place];  Service: Gastroenterology;  Laterality: N/A;  mild diverticulosis    FOOT SURGERY Left     HYSTERECTOMY      KNEE ARTHROSCOPY Right     NASAL SEPTUM SURGERY      SEPTOPLASTY          Social History     Occupational History    Not on file   Tobacco Use    Smoking status: Never     Passive exposure: Yes    Smokeless tobacco: Never    Tobacco comments:     None   Vaping Use    Vaping status: Never Used   Substance and Sexual Activity    Alcohol use: No    Drug use: No    Sexual activity: Yes     Partners: Female     Birth control/protection: None, Same-sex partner     Comment: Menapause      Social History     Social History Narrative    Not on file        Family History   Problem Relation Age of Onset    Cancer Mother     Heart failure Father              Review of Systems:     Review of Systems      Physical Exam: 59 y.o. female  General Appearance:    Alert, cooperative, in no acute distress                   Vitals:    06/21/24 0950   Temp: 98.3 °F (36.8 °C)   Weight: 83 kg (183 lb)   Height: 167.6 cm (66\")   PainSc:   8      Patient is alert and read ×3 no acute distress appears her above-listed at height weight and age.  Affect is normal respiratory rate is normal unlabored. Heart rate regular rate rhythm, sclera, dentition and hearing are normal for the purpose of this exam.        Ortho Exam exam of the left knee she has a mild effusion she is sitting and resting position of 5 to 8 degrees of flexion and cannot really get her fully extended flexion is normal she appears to have a stable augmented exam with a negative anterior drawer negative Lachman but I do think there is a lot of hamstring guarding she has marked palp tenderness laterally she has pain with bounce home pain with lateral " Carlos Enrique    Procedures             Radiology:   AP, Lateral and merchant views of the left knee  were ordered/reviewed to evauateknee pain.  I have no comparative films she has patella britta but no obvious acute pathology she also has some patellofemoral arthritis  Imaging Results (Most Recent)       Procedure Component Value Units Date/Time    XR Knee 3 View Left [[id number]] Resulted: 06/21/24 0930     Updated: 06/21/24 0930    Impression:      Ordering physician's impression is located in the Encounter Note dated 06/21/24. X-ray performed in the DR room.            Assessment/Plan:      Left knee pain is a good mechanism for an ACL although my exam today makes me think her ACL is okay I would also be concerned about a lateral meniscus tear and a lateral tibial plateau fracture despite the fact that her x-rays look normal.  Her exam is significant the injury was significant enough plan is to get an MRI                        Answers submitted by the patient for this visit:  Other (Submitted on 6/18/2024)  Please describe your symptoms.: Left knee. Injury 1 month ago. Not getting better.   Cannot straighten left leg fully  due to pain and an unstable feel. Hurts to bend or lift knee  Have you had these symptoms before?: No  How long have you been having these symptoms?: Greater than 2 weeks  Please list any medications you are currently taking for this condition.: No  Please describe any probable cause for these symptoms. : A large dog knocked into me at an odd angle  Primary Reason for Visit (Submitted on 6/18/2024)  What is the primary reason for your visit?: Other

## 2024-06-21 ENCOUNTER — OFFICE VISIT (OUTPATIENT)
Dept: ORTHOPEDIC SURGERY | Facility: CLINIC | Age: 59
End: 2024-06-21
Payer: COMMERCIAL

## 2024-06-21 VITALS — BODY MASS INDEX: 29.41 KG/M2 | TEMPERATURE: 98.3 F | WEIGHT: 183 LBS | HEIGHT: 66 IN

## 2024-06-21 DIAGNOSIS — M25.562 LEFT KNEE PAIN, UNSPECIFIED CHRONICITY: Primary | ICD-10-CM

## 2024-06-21 DIAGNOSIS — S83.282A TEAR OF LATERAL MENISCUS OF LEFT KNEE, CURRENT, UNSPECIFIED TEAR TYPE, INITIAL ENCOUNTER: ICD-10-CM

## 2024-06-24 ENCOUNTER — TELEPHONE (OUTPATIENT)
Dept: ORTHOPEDIC SURGERY | Facility: CLINIC | Age: 59
End: 2024-06-24

## 2024-06-24 ENCOUNTER — OFFICE VISIT (OUTPATIENT)
Dept: ORTHOPEDIC SURGERY | Facility: CLINIC | Age: 59
End: 2024-06-24
Payer: COMMERCIAL

## 2024-06-24 VITALS — HEIGHT: 66 IN | BODY MASS INDEX: 29.41 KG/M2 | TEMPERATURE: 98.2 F | WEIGHT: 183 LBS

## 2024-06-24 DIAGNOSIS — S83.512D RUPTURE OF ANTERIOR CRUCIATE LIGAMENT OF LEFT KNEE, SUBSEQUENT ENCOUNTER: Primary | ICD-10-CM

## 2024-06-24 DIAGNOSIS — S83.282D TEAR OF LATERAL MENISCUS OF LEFT KNEE, CURRENT, UNSPECIFIED TEAR TYPE, SUBSEQUENT ENCOUNTER: ICD-10-CM

## 2024-06-24 PROCEDURE — 73562 X-RAY EXAM OF KNEE 3: CPT | Performed by: ORTHOPAEDIC SURGERY

## 2024-06-24 PROCEDURE — 99213 OFFICE O/P EST LOW 20 MIN: CPT | Performed by: ORTHOPAEDIC SURGERY

## 2024-06-24 RX ORDER — HYDROCODONE BITARTRATE AND ACETAMINOPHEN 5; 325 MG/1; MG/1
1 TABLET ORAL EVERY 4 HOURS PRN
Qty: 20 TABLET | Refills: 0 | Status: SHIPPED | OUTPATIENT
Start: 2024-06-24

## 2024-06-24 NOTE — TELEPHONE ENCOUNTER
Provider: WILL    Caller: MANUEL LAWSON     Relationship to Patient: PATIENT    Pharmacy: NA    Phone Number: [phone] (home)       Reason for Call: PATIENT IS NEEDING URGENT APPT AFTER A FALL IN HER GARAGE ON THE LEFT KNEE 6.23.24  PATIENT STATES KNEE IS NO LONGER WEIGHT BEARING AND IS USING CRUTCHES  PATIENT IS NEEDING ASAP APPT AND WOULD LIKE A CALL BACK    When was the patient last seen: 6.21.24

## 2024-06-24 NOTE — PROGRESS NOTES
New Knee      Patient: Valerie Avina        YOB: 1965    Medical Record Number: [id number]        Chief Complaints: Left knee pain      History of Present Illness: This is a 59-year-old female who I saw in the last week for left knee injury from dog hitting her and was concerned about ACL as well as lateral tibial plateau fracture lateral meniscus tear.  Sunday she was in her garage hit a wet spot and it sounds like hyperextended her knee and now she cannot put weight on it cannot fully extend        Allergies: No Known Allergies    Medications:   Home Medications:  Current Outpatient Medications on File Prior to Visit   Medication Sig    buPROPion XL (WELLBUTRIN XL) 300 MG 24 hr tablet Take 1 tablet by mouth Daily.    escitalopram (LEXAPRO) 10 MG tablet Take 1 tablet by mouth Daily.    melatonin 5 MG tablet tablet Take 1 tablet by mouth At Night As Needed.    Omega-3 Fatty Acids (FISH OIL) 1000 MG capsule capsule Take  by mouth Daily With Breakfast.    zolpidem (AMBIEN) 10 MG tablet Take 1 tablet by mouth At Night As Needed.    BD Pen Needle Miranda 2nd Gen 32G X 4 MM misc See Admin Instructions.     No current facility-administered medications on file prior to visit.     Current Medications:  Scheduled Meds:  Continuous Infusions:No current facility-administered medications for this visit.    PRN Meds:.    Past Medical History:   Diagnosis Date    Ankle sprain 2weeks ago    Anxiety     Depression     Fracture of ankle 2022    Fracture of wrist 20 years ago    Fracture, foot 2022    Insomnia     Sleep difficulties     Tear of meniscus of knee 10 years ago        Past Surgical History:   Procedure Laterality Date    ANKLE OPEN REDUCTION INTERNAL FIXATION  2022    COLONOSCOPY N/A 05/16/2022    Procedure: COLONOSCOPY;  Surgeon: Khadra Sanchez MD;  Location: [facility] [place] [facility];  Service: Gastroenterology;  Laterality: N/A;  mild diverticulosis    FOOT SURGERY Left     HYSTERECTOMY      KNEE ARTHROSCOPY  "Right     NASAL SEPTUM SURGERY      SEPTOPLASTY          Social History     Occupational History    Not on file   Tobacco Use    Smoking status: Never     Passive exposure: Yes    Smokeless tobacco: Never    Tobacco comments:     None   Vaping Use    Vaping status: Never Used   Substance and Sexual Activity    Alcohol use: No    Drug use: No    Sexual activity: Yes     Partners: Female     Birth control/protection: None, Same-sex partner     Comment: Menapause      Social History     Social History Narrative    Not on file        Family History   Problem Relation Age of Onset    Cancer Mother     Heart failure Father              Review of Systems:     Review of Systems      Physical Exam: 59 y.o. female  General Appearance:    Alert, cooperative, in no acute distress                   Vitals:    06/24/24 1337   Temp: 98.2 °F (36.8 °C)   Weight: 83 kg (183 lb)   Height: 167.6 cm (66\")   PainSc: 10-Worst pain ever      Patient is alert and read ×3 no acute distress appears her above-listed at height weight and age.  Affect is normal respiratory rate is normal unlabored. Heart rate regular rate rhythm, sclera, dentition and hearing are normal for the purpose of this exam.        Ortho Exam  Exam of the left knee she does have a mild effusion she has marked tenderness laterally I am not getting a positive Lachman although she has a lot of guarding on not sure is an accurate exam she has pain with bounce home pain with even attempting a Carlos Enrique  Procedures             Radiology:   AP, Lateral and merchant views of the left knee  were ordered/reviewed to evauateknee pain.  I compared x-rays last week I do not see an obvious fracture  Imaging Results (Most Recent)       Procedure Component Value Units Date/Time    XR Knee 3 View Left [[id number]] Resulted: 06/24/24 1410     Updated: 06/24/24 1410    Impression:      Ordering physician's impression is located in the Encounter Note dated 06/24/24. X-ray performed in the DR " room.            Assessment/Plan:    Left knee injury still in my differentials ACL tear, lateral meniscus tear and fracture plan is to proceed with an MRI she has 1 on Friday but her symptoms are so much worse we will order a stat MRI

## 2024-06-26 ENCOUNTER — TELEPHONE (OUTPATIENT)
Dept: ORTHOPEDIC SURGERY | Facility: CLINIC | Age: 59
End: 2024-06-26

## 2024-06-26 ENCOUNTER — TELEPHONE (OUTPATIENT)
Dept: ORTHOPEDIC SURGERY | Facility: CLINIC | Age: 59
End: 2024-06-26
Payer: COMMERCIAL

## 2024-06-26 NOTE — TELEPHONE ENCOUNTER
Please tell her that her ACL looks good it looks like she has a large tear of her lateral meniscus I would recommend arthroscopy

## 2024-06-26 NOTE — TELEPHONE ENCOUNTER
Caller: Valerie Avina    Relationship: Self    Best call back number:    Caller requesting test results: PATIENT     What test was performed: MRI    When was the test performed: 6/25/24     Where was the test performed: [facility]    Additional notes: PATIENT HAS IMAGING ON A DISC.  PLEASE CALL TO GO OVER RESULTS OR SCHEDULE APPT SOONER THAN 7/16/24.  SHE WOULD PREFER [facility] IF SHE HAS TO COME IN

## 2024-06-27 ENCOUNTER — PREP FOR SURGERY (OUTPATIENT)
Dept: OTHER | Facility: HOSPITAL | Age: 59
End: 2024-06-27
Payer: COMMERCIAL

## 2024-06-27 DIAGNOSIS — S83.272D COMPLEX TEAR OF LATERAL MENISCUS OF LEFT KNEE AS CURRENT INJURY, SUBSEQUENT ENCOUNTER: Primary | ICD-10-CM

## 2024-06-27 PROBLEM — S83.272A COMPLEX TEAR OF LATERAL MENISCUS OF LEFT KNEE AS CURRENT INJURY: Status: ACTIVE | Noted: 2024-06-27

## 2024-06-27 NOTE — TELEPHONE ENCOUNTER
I have put a case request in lets try to get her on for the 10th and maybe I could see her back on the eighth or ninth for preop discussion.  Oralia in the meantime tell her she can weight-bear as she feels like she can ice keep the knee moving and then lets get it scoped

## 2024-07-02 NOTE — PROGRESS NOTES
Patient: Valerie Avina  YOB: 1965  Date of Service: 7/2/2024    Chief Complaints: Left knee pain    Subjective:    History of Present Illness: Pt is seen in the office today with complaints of   Left knee pain she is here follow-up with her MRI.  MRI demonstrates a tear of the lateral meniscus some degenerative changes seen within the medial meniscus she has some edema in the ACL suggestive of a sprain but no marcus tear she is doing better she states she is able to bend this able to put some weight on it      Allergies: No Known Allergies    Medications:   Home Medications:  Current Outpatient Medications on File Prior to Visit   Medication Sig    BD Pen Needle Miranda 2nd Gen 32G X 4 MM misc See Admin Instructions.    buPROPion XL (WELLBUTRIN XL) 300 MG 24 hr tablet Take 1 tablet by mouth Daily.    escitalopram (LEXAPRO) 10 MG tablet Take 1 tablet by mouth Daily.    HYDROcodone-acetaminophen (NORCO) 5-325 MG per tablet Take 1 tablet by mouth Every 4 (Four) Hours As Needed for Severe Pain. 1-2 oral Q4H PRN severe pain    HYDROcodone-acetaminophen (NORCO) 5-325 MG per tablet Take 1 tablet by mouth Every 4 (Four) Hours As Needed for Severe Pain.    melatonin 5 MG tablet tablet Take 1 tablet by mouth At Night As Needed.    Omega-3 Fatty Acids (FISH OIL) 1000 MG capsule capsule Take  by mouth Daily With Breakfast.    zolpidem (AMBIEN) 10 MG tablet Take 1 tablet by mouth At Night As Needed.     No current facility-administered medications on file prior to visit.     Current Medications:  Scheduled Meds:  Continuous Infusions:No current facility-administered medications for this visit.    PRN Meds:.    I have reviewed the patient's medical history in detail and updated the computerized patient record.  Review and summarization of old records include:    Past Medical History:   Diagnosis Date    Ankle sprain 2weeks ago    Anxiety     Depression     Fracture of ankle 2022    Fracture of wrist 20 years ago     Fracture, foot 2022    Insomnia     Sleep difficulties     Tear of meniscus of knee 10 years ago        Past Surgical History:   Procedure Laterality Date    ANKLE OPEN REDUCTION INTERNAL FIXATION  2022    COLONOSCOPY N/A 05/16/2022    Procedure: COLONOSCOPY;  Surgeon: Khadra Sanchez MD;  [place] [facility] [place]  Service: Gastroenterology;  Laterality: N/A;  mild diverticulosis    FOOT SURGERY Left     HYSTERECTOMY      KNEE ARTHROSCOPY Right     NASAL SEPTUM SURGERY      SEPTOPLASTY          Social History     Occupational History    Not on file   Tobacco Use    Smoking status: Never     Passive exposure: Yes    Smokeless tobacco: Never    Tobacco comments:     None   Vaping Use    Vaping status: Never Used   Substance and Sexual Activity    Alcohol use: No    Drug use: No    Sexual activity: Yes     Partners: Female     Birth control/protection: None, Same-sex partner     Comment: Menapause      Social History     Social History Narrative    Not on file        Family History   Problem Relation Age of Onset    Cancer Mother     Heart failure Father        ROS: 14 point review of systems was performed and was negative except for documented findings in HPI and today's encounter.     Allergies: No Known Allergies  Constitutional:  Denies fever, shaking or chills   Eyes:  Denies change in visual acuity   HENT:  Denies nasal congestion or sore throat   Respiratory:  Denies cough or shortness of breath   Cardiovascular:  Denies chest pain or severe LE edema   GI:  Denies abdominal pain, nausea, vomiting, bloody stools or diarrhea   Musculoskeletal:  Numbness, tingling, or loss of motor function only as noted above in history of present illness.  : Denies painful urination or hematuria  Integument:  Denies rash, lesion or ulceration   Neurologic:  Denies headache or focal weakness  Endocrine:  Denies lymphadenopathy  Psych:  Denies confusion or change in mental status   Hem:  Denies active bleeding      Physical  "Exam: 59 y.o. female  Wt Readings from Last 3 Encounters:   06/24/24 83 kg (183 lb)   06/21/24 83 kg (183 lb)   05/16/24 89.4 kg (197 lb)     There is no height or weight on file to calculate BMI.    There were no vitals filed for this visit.  Vital signs reviewed.   General Appearance:    Alert, cooperative, in no acute distress                    Ortho exam      Exam the left knee she is sitting and resting position of about 5 degrees of flexion I would like her to really work on getting that lasts a bit of extension quads are atrophied as would be expected she has tenderness laterally more than medially positive bounce home positive Tate I feel like she has a stable diminished exam but is a bit difficult       Physical Exam: 59 y.o. female  General Appearance:    Alert, cooperative, in no acute distress                      Vitals:    07/08/24 0804   Temp: 98.5 °F (36.9 °C)   Weight: 81.6 kg (180 lb)   Height: 167.6 cm (66\")   PainSc:   4        Head:    Normocephalic, without obvious abnormality, atraumatic   Eyes:            conjunctivae and sclerae normal, no pallor, corneas clear,    Ears:    Ears appear intact with no abnormalities noted   Throat:   No oral lesions, no thrush, oral mucosa moist   Neck:   No adenopathy, supple, trachea midline, no thyromegaly,    Back:     No kyphosis present, no scoliosis present, no skin lesions,      erythema or scars, no tenderness to percussion or                   palpation,   range of motion normal   Lungs:     ,respirations regular, even and                  unlabored    Heart:    Regular rhythm and normal rate               Chest Wall:    No abnormalities observed               Pulses:   Pulses palpable and equal bilaterally   Skin:   No bleeding, bruising or rash   Lymph nodes:   No palpable adenopathy   Neurologic:   Appears neurologic intact         Assessment: Left knee lateral meniscus tear possibly medial meniscus tear plan is to proceed with arthroscopy on " Wednesday I did discuss with her in detail risk benefits and alternatives  The patient voiced understanding of the risks, benefits, and alternative forms of treatment that were discussed and the patient consents to proceed with the above listed surgery.  All risks, benefits and alternatives were discussed.  Risks including to but not exclusive to anesthetic complications, including death, MI, CVA, infection, bleeding DVT, fracture, residual pain and need for future surgery.    She understands and agrees to proceed  Plan: As above  Follow up as indicated.  Ice, elevate, and rest as needed.  Discussed conservative measures of pain control including ice, bracing.    Tia Phoenix M.D.

## 2024-07-05 ENCOUNTER — PRE-ADMISSION TESTING (OUTPATIENT)
Dept: PREADMISSION TESTING | Facility: HOSPITAL | Age: 59
End: 2024-07-05
Payer: COMMERCIAL

## 2024-07-05 VITALS
HEART RATE: 76 BPM | SYSTOLIC BLOOD PRESSURE: 115 MMHG | OXYGEN SATURATION: 98 % | TEMPERATURE: 98.1 F | WEIGHT: 179.6 LBS | RESPIRATION RATE: 20 BRPM | HEIGHT: 66 IN | DIASTOLIC BLOOD PRESSURE: 71 MMHG | BODY MASS INDEX: 28.87 KG/M2

## 2024-07-05 LAB
ANION GAP SERPL CALCULATED.3IONS-SCNC: 12.2 MMOL/L (ref 5–15)
BUN SERPL-MCNC: 20 MG/DL (ref 6–20)
BUN/CREAT SERPL: 27.4 (ref 7–25)
CALCIUM SPEC-SCNC: 9.3 MG/DL (ref 8.6–10.5)
CHLORIDE SERPL-SCNC: 103 MMOL/L (ref 98–107)
CO2 SERPL-SCNC: 21.8 MMOL/L (ref 22–29)
CREAT SERPL-MCNC: 0.73 MG/DL (ref 0.57–1)
DEPRECATED RDW RBC AUTO: 46.7 FL (ref 37–54)
EGFRCR SERPLBLD CKD-EPI 2021: 95.5 ML/MIN/1.73
ERYTHROCYTE [DISTWIDTH] IN BLOOD BY AUTOMATED COUNT: 13.1 % (ref 12.3–15.4)
GLUCOSE SERPL-MCNC: 98 MG/DL (ref 65–99)
HCT VFR BLD AUTO: 41.4 % (ref 34–46.6)
HGB BLD-MCNC: 13.8 G/DL (ref 12–15.9)
MCH RBC QN AUTO: 32.2 PG (ref 26.6–33)
MCHC RBC AUTO-ENTMCNC: 33.3 G/DL (ref 31.5–35.7)
MCV RBC AUTO: 96.7 FL (ref 79–97)
PLATELET # BLD AUTO: 274 10*3/MM3 (ref 140–450)
PMV BLD AUTO: 10.3 FL (ref 6–12)
POTASSIUM SERPL-SCNC: 4.1 MMOL/L (ref 3.5–5.2)
RBC # BLD AUTO: 4.28 10*6/MM3 (ref 3.77–5.28)
SODIUM SERPL-SCNC: 137 MMOL/L (ref 136–145)
WBC NRBC COR # BLD AUTO: 7.93 10*3/MM3 (ref 3.4–10.8)

## 2024-07-05 PROCEDURE — 36415 COLL VENOUS BLD VENIPUNCTURE: CPT

## 2024-07-05 PROCEDURE — 85027 COMPLETE CBC AUTOMATED: CPT

## 2024-07-05 PROCEDURE — 80048 BASIC METABOLIC PNL TOTAL CA: CPT

## 2024-07-05 NOTE — DISCHARGE INSTRUCTIONS
Take the following medications the morning of surgery:    WELLBUTRIN    If you are on prescription narcotic pain medication to control your pain you may also take that medication the morning of surgery.    General Instructions:  Do not eat solid food after midnight the night before surgery.  You may drink clear liquids day of surgery but must stop at least one hour before your hospital arrival time.  It is beneficial for you to have a clear drink that contains carbohydrates the day of surgery.  We suggest a 12 to 20 ounce bottle of Gatorade or Powerade for non-diabetic patients or a 12 to 20 ounce bottle of G2 or Powerade Zero for diabetic patients. (Pediatric patients, are not advised to drink a 12 to 20 ounce carbohydrate drink)    Clear liquids are liquids you can see through.  Nothing red in color.     Plain water                               Sports drinks  Sodas                                   Gelatin (Jell-O)  Fruit juices without pulp such as white grape juice and apple juice  Popsicles that contain no fruit or yogurt  Tea or coffee (no cream or milk added)  Gatorade / Powerade  G2 / Powerade Zero    Infants may have breast milk up to four hours before surgery.  Infants drinking formula may drink formula up to six hours before surgery.   Patients who avoid smoking, chewing tobacco and alcohol for 4 weeks prior to surgery have a reduced risk of post-operative complications.  Quit smoking as many days before surgery as you can.  Do not smoke, use chewing tobacco or drink alcohol the day of surgery.   If applicable bring your C-PAP/ BI-PAP machine in with you to preop day of surgery.  Bring any papers given to you in the doctor’s office.  Wear clean comfortable clothes.  Do not wear contact lenses, false eyelashes or make-up.  Bring a case for your glasses.   Bring crutches or walker if applicable.  Remove all piercings.  Leave jewelry and any other valuables at home.  Hair extensions with metal clips must be  removed prior to surgery.  The Pre-Admission Testing nurse will instruct you to bring medications if unable to obtain an accurate list in Pre-Admission Testing.        Preventing a Surgical Site Infection:  For 2 to 3 days before surgery, avoid shaving with a razor because the razor can irritate skin and make it easier to develop an infection.    Any areas of open skin can increase the risk of a post-operative wound infection by allowing bacteria to enter and travel throughout the body.  Notify your surgeon if you have any skin wounds / rashes even if it is not near the expected surgical site.  The area will need assessed to determine if surgery should be delayed until it is healed.  The night prior to surgery shower using a fresh bar of anti-bacterial soap (such as Dial) and clean washcloth.  Sleep in a clean bed with clean clothing.  Do not allow pets to sleep with you.  Shower on the morning of surgery using a fresh bar of anti-bacterial soap (such as Dial) and clean washcloth.  Dry with a clean towel and dress in clean clothing.  Ask your surgeon if you will be receiving antibiotics prior to surgery.  Make sure you, your family, and all healthcare providers clean their hands with soap and water or an alcohol based hand  before caring for you or your wound.    Day of surgery:  Your arrival time is approximately two hours before your scheduled surgery time.  Upon arrival, a Pre-op nurse and Anesthesiologist will review your health history, obtain vital signs, and answer questions you may have.  The only belongings needed at this time will be a list of your home medications and if applicable your C-PAP/BI-PAP machine.  A Pre-op nurse will start an IV and you may receive medication in preparation for surgery, including something to help you relax.     Please be aware that surgery does come with discomfort.  We want to make every effort to control your discomfort so please discuss any uncontrolled symptoms  with your nurse.   Your doctor will most likely have prescribed pain medications.      If you are going home after surgery you will receive individualized written care instructions before being discharged.  A responsible adult must drive you to and from the hospital on the day of your surgery and ideally stay with you through the night.   .  Discharge prescriptions can be filled by the hospital pharmacy during regular pharmacy hours.  If you are having surgery late in the day/evening your prescription may be e-prescribed to your pharmacy.  Please verify your pharmacy hours or chose a 24 hour pharmacy to avoid not having access to your prescription because your pharmacy has closed for the day.    If you are staying overnight following surgery, you will be transported to your hospital room following the recovery period.  [facility] has all private rooms.    If you have any questions please call Pre-Admission Testing at [phone].  Deductibles and co-payments are collected on the day of service. Please be prepared to pay the required co-pay, deductible or deposit on the day of service as defined by your plan.    Call your surgeon immediately if you experience any of the following symptoms:  Sore Throat  Shortness of Breath or difficulty breathing  Cough  Chills  Body soreness or muscle pain  Headache  Fever  New loss of taste or smell  Do not arrive for your surgery ill.  Your procedure will need to be rescheduled to another time.  You will need to call your physician before the day of surgery to avoid any unnecessary exposure to hospital staff as well as other patients.

## 2024-07-08 ENCOUNTER — OFFICE VISIT (OUTPATIENT)
Dept: ORTHOPEDIC SURGERY | Facility: CLINIC | Age: 59
End: 2024-07-08
Payer: COMMERCIAL

## 2024-07-08 VITALS — WEIGHT: 180 LBS | BODY MASS INDEX: 28.93 KG/M2 | HEIGHT: 66 IN | TEMPERATURE: 98.5 F

## 2024-07-08 DIAGNOSIS — S83.272D COMPLEX TEAR OF LATERAL MENISCUS OF LEFT KNEE AS CURRENT INJURY, SUBSEQUENT ENCOUNTER: Primary | ICD-10-CM

## 2024-07-08 PROCEDURE — 99213 OFFICE O/P EST LOW 20 MIN: CPT | Performed by: ORTHOPAEDIC SURGERY

## 2024-07-10 ENCOUNTER — HOSPITAL ENCOUNTER (OUTPATIENT)
Facility: HOSPITAL | Age: 59
Setting detail: HOSPITAL OUTPATIENT SURGERY
Discharge: HOME | End: 2024-07-10
Attending: ORTHOPAEDIC SURGERY | Admitting: ORTHOPAEDIC SURGERY
Payer: COMMERCIAL

## 2024-07-10 ENCOUNTER — ANESTHESIA EVENT (OUTPATIENT)
Dept: PERIOP | Facility: HOSPITAL | Age: 59
End: 2024-07-10
Payer: COMMERCIAL

## 2024-07-10 ENCOUNTER — ANESTHESIA (OUTPATIENT)
Dept: PERIOP | Facility: HOSPITAL | Age: 59
End: 2024-07-10
Payer: COMMERCIAL

## 2024-07-10 VITALS
OXYGEN SATURATION: 98 % | SYSTOLIC BLOOD PRESSURE: 126 MMHG | HEART RATE: 74 BPM | TEMPERATURE: 97.5 F | DIASTOLIC BLOOD PRESSURE: 76 MMHG | RESPIRATION RATE: 16 BRPM

## 2024-07-10 DIAGNOSIS — S83.272D COMPLEX TEAR OF LATERAL MENISCUS OF LEFT KNEE AS CURRENT INJURY, SUBSEQUENT ENCOUNTER: ICD-10-CM

## 2024-07-10 PROCEDURE — 25010000002 FENTANYL CITRATE (PF) 50 MCG/ML SOLUTION

## 2024-07-10 PROCEDURE — 25010000002 HYDROMORPHONE PER 4 MG

## 2024-07-10 PROCEDURE — 25810000003 LACTATED RINGERS PER 1000 ML: Performed by: STUDENT IN AN ORGANIZED HEALTH CARE EDUCATION/TRAINING PROGRAM

## 2024-07-10 PROCEDURE — 29881 ARTHRS KNE SRG MNISECTMY M/L: CPT | Performed by: ORTHOPAEDIC SURGERY

## 2024-07-10 PROCEDURE — 25010000002 DEXAMETHASONE SODIUM PHOSPHATE 20 MG/5ML SOLUTION

## 2024-07-10 PROCEDURE — 25010000002 GLYCOPYRROLATE 0.2 MG/ML SOLUTION

## 2024-07-10 PROCEDURE — 25010000002 METHYLPREDNISOLONE PER 80 MG: Performed by: ORTHOPAEDIC SURGERY

## 2024-07-10 PROCEDURE — 25010000002 PROPOFOL 200 MG/20ML EMULSION

## 2024-07-10 PROCEDURE — 25810000003 LACTATED RINGERS PER 1000 ML: Performed by: ORTHOPAEDIC SURGERY

## 2024-07-10 PROCEDURE — 25010000002 CEFAZOLIN PER 500 MG: Performed by: ORTHOPAEDIC SURGERY

## 2024-07-10 PROCEDURE — 25010000002 ONDANSETRON PER 1 MG

## 2024-07-10 PROCEDURE — 25010000002 EPINEPHRINE PER 0.1 MG: Performed by: ORTHOPAEDIC SURGERY

## 2024-07-10 PROCEDURE — 29881 ARTHRS KNE SRG MNISECTMY M/L: CPT | Performed by: TECHNICIAN, OTHER

## 2024-07-10 RX ORDER — HYDROMORPHONE HYDROCHLORIDE 1 MG/ML
0.5 INJECTION, SOLUTION INTRAMUSCULAR; INTRAVENOUS; SUBCUTANEOUS
Status: DISCONTINUED | OUTPATIENT
Start: 2024-07-10 | End: 2024-07-11 | Stop reason: HOSPADM

## 2024-07-10 RX ORDER — NALOXONE HCL 0.4 MG/ML
0.2 VIAL (ML) INJECTION AS NEEDED
Status: DISCONTINUED | OUTPATIENT
Start: 2024-07-10 | End: 2024-07-11 | Stop reason: HOSPADM

## 2024-07-10 RX ORDER — IPRATROPIUM BROMIDE AND ALBUTEROL SULFATE 2.5; .5 MG/3ML; MG/3ML
3 SOLUTION RESPIRATORY (INHALATION) ONCE AS NEEDED
Status: DISCONTINUED | OUTPATIENT
Start: 2024-07-10 | End: 2024-07-11 | Stop reason: HOSPADM

## 2024-07-10 RX ORDER — EPHEDRINE SULFATE 50 MG/ML
5 INJECTION, SOLUTION INTRAVENOUS ONCE AS NEEDED
Status: DISCONTINUED | OUTPATIENT
Start: 2024-07-10 | End: 2024-07-11 | Stop reason: HOSPADM

## 2024-07-10 RX ORDER — HYDROCODONE BITARTRATE AND ACETAMINOPHEN 5; 325 MG/1; MG/1
1 TABLET ORAL EVERY 4 HOURS PRN
Qty: 30 TABLET | Refills: 0 | Status: SHIPPED | OUTPATIENT
Start: 2024-07-10

## 2024-07-10 RX ORDER — LABETALOL HYDROCHLORIDE 5 MG/ML
5 INJECTION, SOLUTION INTRAVENOUS
Status: DISCONTINUED | OUTPATIENT
Start: 2024-07-10 | End: 2024-07-11 | Stop reason: HOSPADM

## 2024-07-10 RX ORDER — DIPHENHYDRAMINE HYDROCHLORIDE 50 MG/ML
12.5 INJECTION INTRAMUSCULAR; INTRAVENOUS
Status: DISCONTINUED | OUTPATIENT
Start: 2024-07-10 | End: 2024-07-11 | Stop reason: HOSPADM

## 2024-07-10 RX ORDER — FENTANYL CITRATE 50 UG/ML
INJECTION, SOLUTION INTRAMUSCULAR; INTRAVENOUS AS NEEDED
Status: DISCONTINUED | OUTPATIENT
Start: 2024-07-10 | End: 2024-07-10 | Stop reason: SURG

## 2024-07-10 RX ORDER — FENTANYL CITRATE 50 UG/ML
50 INJECTION, SOLUTION INTRAMUSCULAR; INTRAVENOUS ONCE AS NEEDED
Status: DISCONTINUED | OUTPATIENT
Start: 2024-07-10 | End: 2024-07-10 | Stop reason: HOSPADM

## 2024-07-10 RX ORDER — FENTANYL CITRATE 50 UG/ML
50 INJECTION, SOLUTION INTRAMUSCULAR; INTRAVENOUS
Status: DISCONTINUED | OUTPATIENT
Start: 2024-07-10 | End: 2024-07-11 | Stop reason: HOSPADM

## 2024-07-10 RX ORDER — ONDANSETRON 2 MG/ML
4 INJECTION INTRAMUSCULAR; INTRAVENOUS ONCE AS NEEDED
Status: DISCONTINUED | OUTPATIENT
Start: 2024-07-10 | End: 2024-07-11 | Stop reason: HOSPADM

## 2024-07-10 RX ORDER — OXYCODONE AND ACETAMINOPHEN 7.5; 325 MG/1; MG/1
1 TABLET ORAL EVERY 4 HOURS PRN
Status: DISCONTINUED | OUTPATIENT
Start: 2024-07-10 | End: 2024-07-11 | Stop reason: HOSPADM

## 2024-07-10 RX ORDER — PROPOFOL 10 MG/ML
INJECTION, EMULSION INTRAVENOUS AS NEEDED
Status: DISCONTINUED | OUTPATIENT
Start: 2024-07-10 | End: 2024-07-10 | Stop reason: SURG

## 2024-07-10 RX ORDER — GLYCOPYRROLATE 0.2 MG/ML
INJECTION INTRAMUSCULAR; INTRAVENOUS AS NEEDED
Status: DISCONTINUED | OUTPATIENT
Start: 2024-07-10 | End: 2024-07-10 | Stop reason: SURG

## 2024-07-10 RX ORDER — PROMETHAZINE HYDROCHLORIDE 25 MG/1
25 SUPPOSITORY RECTAL ONCE AS NEEDED
Status: DISCONTINUED | OUTPATIENT
Start: 2024-07-10 | End: 2024-07-11 | Stop reason: HOSPADM

## 2024-07-10 RX ORDER — FLUMAZENIL 0.1 MG/ML
0.2 INJECTION INTRAVENOUS AS NEEDED
Status: DISCONTINUED | OUTPATIENT
Start: 2024-07-10 | End: 2024-07-11 | Stop reason: HOSPADM

## 2024-07-10 RX ORDER — DEXAMETHASONE SODIUM PHOSPHATE 4 MG/ML
INJECTION, SOLUTION INTRA-ARTICULAR; INTRALESIONAL; INTRAMUSCULAR; INTRAVENOUS; SOFT TISSUE AS NEEDED
Status: DISCONTINUED | OUTPATIENT
Start: 2024-07-10 | End: 2024-07-10 | Stop reason: SURG

## 2024-07-10 RX ORDER — LIDOCAINE HYDROCHLORIDE 10 MG/ML
0.5 INJECTION, SOLUTION INFILTRATION; PERINEURAL ONCE AS NEEDED
Status: DISCONTINUED | OUTPATIENT
Start: 2024-07-10 | End: 2024-07-10 | Stop reason: HOSPADM

## 2024-07-10 RX ORDER — HYDRALAZINE HYDROCHLORIDE 20 MG/ML
5 INJECTION INTRAMUSCULAR; INTRAVENOUS
Status: DISCONTINUED | OUTPATIENT
Start: 2024-07-10 | End: 2024-07-11 | Stop reason: HOSPADM

## 2024-07-10 RX ORDER — PROMETHAZINE HYDROCHLORIDE 25 MG/1
25 TABLET ORAL ONCE AS NEEDED
Status: DISCONTINUED | OUTPATIENT
Start: 2024-07-10 | End: 2024-07-11 | Stop reason: HOSPADM

## 2024-07-10 RX ORDER — ONDANSETRON 2 MG/ML
INJECTION INTRAMUSCULAR; INTRAVENOUS AS NEEDED
Status: DISCONTINUED | OUTPATIENT
Start: 2024-07-10 | End: 2024-07-10 | Stop reason: SURG

## 2024-07-10 RX ORDER — MIDAZOLAM HYDROCHLORIDE 1 MG/ML
1 INJECTION INTRAMUSCULAR; INTRAVENOUS
Status: DISCONTINUED | OUTPATIENT
Start: 2024-07-10 | End: 2024-07-10 | Stop reason: HOSPADM

## 2024-07-10 RX ORDER — HYDROCODONE BITARTRATE AND ACETAMINOPHEN 5; 325 MG/1; MG/1
1 TABLET ORAL ONCE AS NEEDED
Status: COMPLETED | OUTPATIENT
Start: 2024-07-10 | End: 2024-07-10

## 2024-07-10 RX ORDER — SODIUM CHLORIDE, SODIUM LACTATE, POTASSIUM CHLORIDE, CALCIUM CHLORIDE 600; 310; 30; 20 MG/100ML; MG/100ML; MG/100ML; MG/100ML
9 INJECTION, SOLUTION INTRAVENOUS CONTINUOUS
Status: DISCONTINUED | OUTPATIENT
Start: 2024-07-10 | End: 2024-07-11 | Stop reason: HOSPADM

## 2024-07-10 RX ORDER — SODIUM CHLORIDE 0.9 % (FLUSH) 0.9 %
3-10 SYRINGE (ML) INJECTION AS NEEDED
Status: DISCONTINUED | OUTPATIENT
Start: 2024-07-10 | End: 2024-07-10 | Stop reason: HOSPADM

## 2024-07-10 RX ORDER — SODIUM CHLORIDE 0.9 % (FLUSH) 0.9 %
3 SYRINGE (ML) INJECTION EVERY 12 HOURS SCHEDULED
Status: DISCONTINUED | OUTPATIENT
Start: 2024-07-10 | End: 2024-07-10 | Stop reason: HOSPADM

## 2024-07-10 RX ORDER — DROPERIDOL 2.5 MG/ML
0.62 INJECTION, SOLUTION INTRAMUSCULAR; INTRAVENOUS
Status: DISCONTINUED | OUTPATIENT
Start: 2024-07-10 | End: 2024-07-11 | Stop reason: HOSPADM

## 2024-07-10 RX ORDER — LIDOCAINE HYDROCHLORIDE 20 MG/ML
INJECTION, SOLUTION EPIDURAL; INFILTRATION; INTRACAUDAL; PERINEURAL AS NEEDED
Status: DISCONTINUED | OUTPATIENT
Start: 2024-07-10 | End: 2024-07-10 | Stop reason: SURG

## 2024-07-10 RX ADMIN — ONDANSETRON 4 MG: 2 INJECTION INTRAMUSCULAR; INTRAVENOUS at 09:35

## 2024-07-10 RX ADMIN — LIDOCAINE HYDROCHLORIDE 50 MG: 20 INJECTION, SOLUTION EPIDURAL; INFILTRATION; INTRACAUDAL; PERINEURAL at 09:31

## 2024-07-10 RX ADMIN — Medication 3 ML: at 08:19

## 2024-07-10 RX ADMIN — SODIUM CHLORIDE, POTASSIUM CHLORIDE, SODIUM LACTATE AND CALCIUM CHLORIDE 9 ML/HR: 600; 310; 30; 20 INJECTION, SOLUTION INTRAVENOUS at 08:18

## 2024-07-10 RX ADMIN — HYDROMORPHONE HYDROCHLORIDE 0.5 MG: 1 INJECTION, SOLUTION INTRAMUSCULAR; INTRAVENOUS; SUBCUTANEOUS at 10:30

## 2024-07-10 RX ADMIN — FENTANYL CITRATE 50 MCG: 50 INJECTION, SOLUTION INTRAMUSCULAR; INTRAVENOUS at 10:19

## 2024-07-10 RX ADMIN — SODIUM CHLORIDE 2 G: 900 INJECTION INTRAVENOUS at 09:23

## 2024-07-10 RX ADMIN — GLYCOPYRROLATE 0.2 MG: 0.2 INJECTION INTRAMUSCULAR; INTRAVENOUS at 10:01

## 2024-07-10 RX ADMIN — FENTANYL CITRATE 50 MCG: 50 INJECTION, SOLUTION INTRAMUSCULAR; INTRAVENOUS at 09:45

## 2024-07-10 RX ADMIN — DEXAMETHASONE SODIUM PHOSPHATE 8 MG: 4 INJECTION, SOLUTION INTRAMUSCULAR; INTRAVENOUS at 09:35

## 2024-07-10 RX ADMIN — PROPOFOL 200 MG: 10 INJECTION, EMULSION INTRAVENOUS at 09:31

## 2024-07-10 RX ADMIN — FENTANYL CITRATE 25 MCG: 50 INJECTION, SOLUTION INTRAMUSCULAR; INTRAVENOUS at 10:07

## 2024-07-10 RX ADMIN — FENTANYL CITRATE 25 MCG: 50 INJECTION, SOLUTION INTRAMUSCULAR; INTRAVENOUS at 09:38

## 2024-07-10 RX ADMIN — HYDROCODONE BITARTRATE AND ACETAMINOPHEN 1 TABLET: 5; 325 TABLET ORAL at 10:30

## 2024-07-10 NOTE — ANESTHESIA PROCEDURE NOTES
Airway  Urgency: elective    Date/Time: 7/10/2024 9:33 AM  Airway not difficult    General Information and Staff    Patient location during procedure: OR  CRNA/CAA: Keshawn Foster CRNA    Indications and Patient Condition  Indications for airway management: airway protection    Preoxygenated: yes  Mask difficulty assessment: 1 - vent by mask    Final Airway Details  Final airway type: supraglottic airway      Successful airway: LMA  Size 4     Number of attempts at approach: 1  Assessment: lips, teeth, and gum same as pre-op

## 2024-07-10 NOTE — OP NOTE
Operative Note      Facility: [facility]  Patient Name: Valerie Avina  YOB: 1965  Date: 7/10/2024  Medical Record Number: [id number]      Pre-op Diagnosis:   Complex tear of lateral meniscus of left knee as current injury, subsequent encounter [S83.272D]    Post-Op Diagnosis Codes:     * Complex tear of lateral meniscus of left knee as current injury, subsequent encounter [S83.272D]  Grade 3 defect medial femoral condyle, grade 3 changes patella  Procedure(s):  KNEE ARTHROSCOPY PARTIAL LATERAL MENISCECTOMY AND DEBRIDEMENT OF ARTHRITIS chondroplasty of the patella and the medial femoral condyle    Surgeon(s):  Tia Phoenix MD    Anesthesia: General  Anesthesiologist: Brady Medeors MD  CRNA: Keshawn Foster CRNA    Staff:   Circulator: Jean Marie Logan RN  Scrub Person: Joce Fernandez  Vendor Representative: Daniel Malave  Assistant: Nichole Beck CSA    Assistants : Nichole Beck was first assist first assist was used throughout the case for proposition the patient on the table position extremity during the procedure and closure    Estimated Blood Loss: 5 mL    Specimens:    none     Drains: None    Findings: See Dictation    Complications: None      Indication for procedure: This patient has had a several month history of knee pain and has an exam and an MRI which are consistent with meniscal pathology. They understand all options and wish to proceed with arthroscopy.      Description of procedure: The patient was taken to the operating room. They were placed supine on the operating room table. After induction of adequate LMA anesthesia, IV antibiotics the patient underwent exam under anesthesia with symmetric full range of motion. Nonsterile tourniquet was applied patient was placed in the thigh rogers all prominent areas were well padded and end of the table dropped. The leg was prepped and draped in usual sterile fashion. Standard lateral incision was made  with 11 blade. Blunt trocar penetrated into the joint, scope followed and the evaluation began.  The patella.  Is a centrally within the trochlear groove she did have degenerative changes on the patella felt to be grade 3 I then entered the medial compartment under spinal needle localization direct visualization a medial portal was established.  She had a defect on the medial femoral condyle this was debrided with a motorized shaver it measured 15 x 20 mm grade 3 maybe a small area of grade 4.  The notch was normal ACL was fine.  Then entered the lateral compartment and as was expected she had a complex tear of the lateral meniscus with 2 separate flaps.  These were resected with various angles biters baskets motorized julio ultimately the Apollo device.  I did this from the medial and switching the scope from the lateral portal I then turned my attention patellofemoral joint gently debrided the patella            At this point everything was thoroughly irrigated it was suctioned all 3 compartments, the gutters the suprapatellar pouch were all evaluated there was no further acute pathology seen.  Everything was thoroughly irrigated it was injected with Marcaine Depo-Medrol.  The portals were closed with 3-0 nylon interrupted fashion.  Sterile dressings and Ace wraps were applied.  The patient tolerated the procedure well and was taken to recovery room in good condition.  All sponge and needle counts were correct.                    Date: 7/10/2024  Time: 10:12 EDT

## 2024-07-10 NOTE — ANESTHESIA POSTPROCEDURE EVALUATION
Patient: Valerie Avina    Procedure Summary       Date: 07/10/24 Room / Location: [facility] OR [address] [place] [address] [facility]    Anesthesia Start: 0925 Anesthesia Stop: 1017    Procedure: KNEE ARTHROSCOPY PARTIAL LATERAL MENISCECTOMY AND DEBRIDEMENT OF ARTHRITIS (Left: Knee) Diagnosis:       Complex tear of lateral meniscus of left knee as current injury, subsequent encounter      (Complex tear of lateral meniscus of left knee as current injury, subsequent encounter [X20.304E])    Surgeons: Tia Phoenix MD Provider: Brady Mederos MD    Anesthesia Type: general ASA Status: 2            Anesthesia Type: general    Vitals  Vitals Value Taken Time   /75 07/10/24 1045   Temp 36.4 °C (97.5 °F) 07/10/24 1045   Pulse 68 07/10/24 1054   Resp 18 07/10/24 1045   SpO2 99 % 07/10/24 1054   Vitals shown include unfiled device data.        Post Anesthesia Care and Evaluation    Patient location during evaluation: bedside  Patient participation: complete - patient participated  Level of consciousness: awake and alert  Pain management: adequate    Airway patency: patent  Anesthetic complications: No anesthetic complications  PONV Status: controlled  Cardiovascular status: blood pressure returned to baseline and acceptable  Respiratory status: acceptable  Hydration status: acceptable

## 2024-07-10 NOTE — H&P
History & Physical       Patient: Valerie Avina    Date of Admission: No admission date for patient encounter.    YOB: 1965    Medical Record Number: [id number]    Attending Physician: Tia Phoenix MD        Chief Complaints: Complex tear of lateral meniscus of left knee as current injury, subsequent encounter [H74.629M]      History of Present Illness: This patient several week history of severe left knee pain which worsened with an incident in her garage where she slipped she has an exam and MRI which are consistent with a lateral meniscus tear still appears to have some sprain of the ACL but it Verrett feels intact clinically.  Plan is to proceed with arthroscopy partial lateral meniscectomy she understands in the event I find other pathology I will address it as deemed appropriate     Allergies: No Known Allergies    Medications:   Home Medications:  No current facility-administered medications on file prior to encounter.     Current Outpatient Medications on File Prior to Encounter   Medication Sig    buPROPion XL (WELLBUTRIN XL) 300 MG 24 hr tablet Take 1 tablet by mouth Daily.    escitalopram (LEXAPRO) 10 MG tablet Take 1 tablet by mouth Daily. (Patient taking differently: Take 1 tablet by mouth Every Night.)    HYDROcodone-acetaminophen (NORCO) 5-325 MG per tablet Take 1 tablet by mouth Every 4 (Four) Hours As Needed for Severe Pain.    melatonin 5 MG tablet tablet Take 1 tablet by mouth Every Night. (Patient not taking: Reported on 7/8/2024)    Omega-3 Fatty Acids (FISH OIL) 1000 MG capsule capsule Take 1 capsule by mouth Daily With Breakfast. HOLD FOR SURGERY    zolpidem (AMBIEN) 10 MG tablet Take 1 tablet by mouth At Night As Needed. (Patient not taking: Reported on 7/8/2024)     Current Medications:  Scheduled Meds:  Continuous Infusions:No current facility-administered medications for this encounter.    PRN Meds:.    Past Medical History:   Diagnosis Date    Ankle sprain 2weeks  ago    Anxiety and depression     Fracture of ankle     Fracture of wrist 20 years ago    Fracture, foot     Insomnia     Sleep apnea     CPAP    Tear meniscus knee     LEFT:  PAIN, WEAKNESS, LIMITED MOBILITY, SWELLING        Past Surgical History:   Procedure Laterality Date    ANKLE OPEN REDUCTION INTERNAL FIXATION Right     METAL    BREAST AUGMENTATION      COLONOSCOPY N/A 2022    Procedure: COLONOSCOPY;  Surgeon: Khadra Sanchez MD;  [place] [facility] [place];  Service: Gastroenterology;  Laterality: N/A;  mild diverticulosis    FOOT SURGERY Left     GASTRECTOMY      HYSTERECTOMY      KNEE ARTHROSCOPY Right     NASAL SEPTUM SURGERY          Social History     Occupational History    Not on file   Tobacco Use    Smoking status: Former     Current packs/day: 0.00     Types: Cigarettes     Start date: 3/5/2012     Quit date: 2012     Years since quittin.2     Passive exposure: Yes    Smokeless tobacco: Never    Tobacco comments:     None   Vaping Use    Vaping status: Never Used   Substance and Sexual Activity    Alcohol use: Yes     Comment: SOCIAL    Drug use: No    Sexual activity: Yes     Partners: Female     Birth control/protection: None, Same-sex partner     Comment: Menapause      Social History     Social History Narrative    Not on file        Family History   Problem Relation Age of Onset    Cancer Mother     Heart failure Father     Malig Hyperthermia Neg Hx        Review of Systems      Physical Exam: 59 y.o. female  General Appearance:    Alert, cooperative, in no acute distress                    There were no vitals filed for this visit.     Head:  Normocephalic, without obvious abnormality, atraumatic   Eyes:          Conjunctivae and sclerae normal, no pallor, corneas clear,    Ears:  Ears appear intact with no abnormalities noted   Throat: No oral lesions, no thrush, oral mucosa moist   Neck: No adenopathy, supple, trachea midline, no thyromegaly,    Back:   No  kyphosis present, no scoliosis present, no skin lesions,      erythema or scars, no tenderness to percussion or                   palpation,range of motion normal   Lungs:   C respirations regular, even and                 unlabored    Heart:  Regular rhythm and normal rate               Chest Wall:  No abnormalities observed               Pulses: Pulses palpable and equal bilaterally   Skin: No bleeding, bruising or rash   Lymph nodes: No palpable adenopathy   Neurologic: Appears neurologic intact             Assessment:    Complex tear of lateral meniscus of left knee as current injury          Plan: All risks, benefits and alternatives were discussed.  Risks including but not exclusive to anesthetic complications, including death, MI, CVA, infection, bleeding DVT, PE,  fracture, residual pain and need for future surgery.  Patient understood all and agrees to proceed.

## 2024-07-10 NOTE — ANESTHESIA PREPROCEDURE EVALUATION
Anesthesia Evaluation     Patient summary reviewed and Nursing notes reviewed   no history of anesthetic complications:   NPO Solid Status: > 8 hours  NPO Liquid Status: > 2 hours           Airway   Mallampati: II  TM distance: >3 FB  Neck ROM: full  Dental      Pulmonary    (+) ,sleep apnea on CPAP  Cardiovascular         Neuro/Psych  (+) psychiatric history Anxiety and Depression  GI/Hepatic/Renal/Endo      Musculoskeletal     Abdominal    Substance History      OB/GYN          Other                          Anesthesia Plan    ASA 2     general     intravenous induction     Anesthetic plan, risks, benefits, and alternatives have been provided, discussed and informed consent has been obtained with: patient.        CODE STATUS:

## 2024-07-24 NOTE — PROGRESS NOTES
Knee Scope follow Up       Patient: Valerie Avina        YOB: 1965      Chief Complaints: knee pain left      History of Present Illness: Pt is here f/u knee arthroscopy on the left knee she states she is doing great her pain is much better she is very happy with where she is        Allergies: No Known Allergies    Medications:   Home Medications:  Current Outpatient Medications on File Prior to Visit   Medication Sig    buPROPion XL (WELLBUTRIN XL) 300 MG 24 hr tablet Take 1 tablet by mouth Daily.    escitalopram (LEXAPRO) 10 MG tablet Take 1 tablet by mouth Daily. (Patient taking differently: Take 1 tablet by mouth Every Night.)    HYDROcodone-acetaminophen (NORCO) 5-325 MG per tablet Take 1 tablet by mouth Every 4 (Four) Hours As Needed for Severe Pain.    melatonin 5 MG tablet tablet Take 1 tablet by mouth Every Night. (Patient not taking: Reported on 7/8/2024)    Omega-3 Fatty Acids (FISH OIL) 1000 MG capsule capsule Take 1 capsule by mouth Daily With Breakfast. HOLD FOR SURGERY    zolpidem (AMBIEN) 10 MG tablet Take 1 tablet by mouth At Night As Needed.     No current facility-administered medications on file prior to visit.     Current Medications:  Scheduled Meds:  Continuous Infusions:No current facility-administered medications for this visit.    PRN Meds:.          Physical Exam: 59 y.o. female  General Appearance:    Alert, cooperative, in no acute distress                 There were no vitals filed for this visit.   Patient is alert and oriented ×3 no acute distress normal mood physical exam.  Physical exam of the knee, incisions looked good there is no erythema, calf is soft and non-tender.  No sign or sx of DVT      Assessment  S/P knee scope.  Overall doing well.         Plan: Continue with strengthening, progression of activities we will have her start into physical therapy she is going big trip the end of August she will slowly progress her activities I will see her back in 1  month

## 2024-07-25 ENCOUNTER — OFFICE VISIT (OUTPATIENT)
Dept: ORTHOPEDIC SURGERY | Facility: CLINIC | Age: 59
End: 2024-07-25
Payer: COMMERCIAL

## 2024-07-25 VITALS — TEMPERATURE: 98.2 F | WEIGHT: 180.8 LBS | BODY MASS INDEX: 29.06 KG/M2 | HEIGHT: 66 IN

## 2024-07-25 DIAGNOSIS — Z98.890 S/P ARTHROSCOPY OF KNEE: Primary | ICD-10-CM

## 2024-07-25 PROCEDURE — 99024 POSTOP FOLLOW-UP VISIT: CPT | Performed by: ORTHOPAEDIC SURGERY

## 2024-08-01 ENCOUNTER — TREATMENT (OUTPATIENT)
Dept: PHYSICAL THERAPY | Facility: CLINIC | Age: 59
End: 2024-08-01
Payer: COMMERCIAL

## 2024-08-01 DIAGNOSIS — M25.562 LEFT KNEE PAIN, UNSPECIFIED CHRONICITY: ICD-10-CM

## 2024-08-01 DIAGNOSIS — Z98.890 S/P LEFT KNEE ARTHROSCOPY: Primary | ICD-10-CM

## 2024-08-01 NOTE — PROGRESS NOTES
I have reviewed the notes, assessments, and/or procedures performed by Valeriano Cain, I concur with her/his documentation.    Sandra Anthony, PT

## 2024-08-01 NOTE — PROGRESS NOTES
"    Physical Therapy Initial Evaluation and Plan of Care  Clinic Location [address] [facility] [address], [place] [id number]    Patient: Valerie Avina   : 1965  Diagnosis/ICD-10 Code:  S/P left knee arthroscopy [Z98.890]  Referring practitioner: Tia Phoenix MD  Date of Initial Visit: 2024  Today's Date: 2024  Patient seen for 1 session         Visit Diagnoses:    ICD-10-CM ICD-9-CM   1. S/P left knee arthroscopy  Z98.890 V45.89   2. Left knee pain, unspecified chronicity  M25.562 719.46         Subjective Questionnaire: LEFS: 41      Subjective Evaluation    History of Present Illness  Mechanism of injury: Pt reports she was walking a dog a couple of months ago and injured her knee. Then, one month ago she slipped while turning and injured her L knee even worse. Pt underwent L knee arthroscopy on 7/10/24.    Since then, she has been \"doing really good\". Chief complaint is soreness and tenderness following ambulating lots of steps. She reports that her doctor told her she does have a L ACL \"sprain\" which was purposefully neglected during her arthroscopy.          Patient Occupation: , retired from [employer]. Pain  Current pain ratin (\"tender\")  At worst pain ratin  Quality: discomfort and pressure  Relieving factors: rest, relaxation, medications and ice  Aggravating factors: ambulation, stairs, standing, prolonged positioning, squatting and repetitive movement  Progression: improved    Social Support  Lives in: multiple-level home    Patient Goals  Patient goals for therapy: increased strength, return to sport/leisure activities, return to work, decreased pain and increased motion             Objective          Static Posture     Comments  SLS on L > 20 seconds with no LOB.    Observations   Left Knee   Positive for incision.     Additional Knee Observation Details  Posterior sag sign present L knee.    Neurological Testing     Additional Neurological Details  Sensation " intact and symmetrical.    Active Range of Motion   Left Knee   Flexion: 133 degrees   Extension: WFL    Right Knee   Flexion: 145 degrees   Extension: WFL    Strength/Myotome Testing     Left Hip   Planes of Motion   Flexion: 4-    Right Hip   Planes of Motion   Flexion: 4-    Left Knee   Flexion: 4  Extension: 5    Right Knee   Flexion: 5  Extension: 5    Additional Strength Details  Minimal pain with resisted L knee extension.    Tests     Left Knee   Positive posterior sag.     Ambulation   Weight-Bearing Status   Assistive device used: none    Observational Gait   Gait: within functional limits     Functional Assessment     Comments  Steps over 4 inch obstacle with reciprocal gait pattern and no LOB.          Assessment & Plan       Assessment  Impairments: abnormal or restricted ROM, activity intolerance, impaired physical strength, lacks appropriate home exercise program, pain with function, safety issue and weight-bearing intolerance   Functional limitations: carrying objects, walking, sitting, standing and unable to perform repetitive tasks   Assessment details: Pt is a 58 y/o female referred to PT by her physician s/p L knee arthroscopy. Pt displays pain with function and deficits of L knee compared to R including ROM and strength. She also displays a posterior sag sign on L knee indicating ACL instability.  She lack an appropriate HEP and will benefit from skilled PT intervention to address the deficits noted and to return to PLOF.   Prognosis: good  Prognosis details:         Goals  Plan Goals: SHORT TERM GOALS: Time for Goal Achievement: 4 weeks    1.  Patient to be compliant with HEP.   2.  Pt able to ascend/descend steps and transfer with no knee pain.  3.  Increased hip strength to => 4+/5 for improvement with ADLs.    LONG TERM GOALS: Time for Goal Achievement: 8 weeks  1.  Pt to score < 15% perceived disability on LEFS  2.  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  3.  Pt to exhibit knee flexion AROM to 140 degrees to allow for kneeling, bending squatting as is necessary for ADL's, IADL's and household activities.         Plan  Therapy options: will be seen for skilled therapy services  Planned modality interventions: ultrasound, electrical stimulation/Russian stimulation, thermotherapy (hydrocollator packs) and cryotherapy  Planned therapy interventions: balance/weight-bearing training, body mechanics training, functional ROM exercises, flexibility, home exercise program, joint mobilization, stretching, strengthening, soft tissue mobilization, neuromuscular re-education, manual therapy and therapeutic activities  Frequency: 2x week  Duration in weeks: 8  Treatment plan discussed with: patient            Timed:         Manual Therapy:         mins  35863;     Therapeutic Exercise:    10     mins  94622;     Neuromuscular [place]:        mins  39185;    Therapeutic Activity:     10     mins  20089;     Gait Training:           mins  01158;     Ultrasound:          mins  33525;    Ionto                                   mins   62335  Self Care                            mins   79423  Canalith Repos         mins 33017      Un-Timed:  Electrical Stimulation:         mins  72255 ( );  Dry Needling          mins self-pay  Traction          mins 06139  Low Eval     15     Mins  38811  Mod Eval          Mins  36620  High Eval                            Mins  26629        Timed Treatment:   20   mins   Total Treatment:     35   mins          PT: Valeriano Cain PT     Temporary License Number:   Electronically signed by Valeriano Cain PT, 08/01/24, 7:36 AM EDT    Certification Period: 8/1/2024 thru 10/29/2024  I certify that the therapy services are furnished while this patient is under my care.  The services outlined above are required by this patient, and will be reviewed every 90 days.         Physician  Signature:__________________________________________________    PHYSICIAN: Tia Phoenix MD  NPI: [id number]                                      DATE:      Please sign and return via fax to [email or web address] . Thank you, [facility].

## 2024-08-01 NOTE — PATIENT INSTRUCTIONS
Access Code: [email or web address]  URL: [email or web address]  Date: 08/01/2024  Prepared by: Valeriano Cain    Exercises  - Long Sitting Quad Set with Towel Roll Under Heel  - 2 x daily - 7 x weekly - 2 sets - 10 reps - 5s hold  - Active Straight Leg Raise with Quad Set  - 2 x daily - 7 x weekly - 2 sets - 10 reps  - Straight Leg Raise with External Rotation  - 2 x daily - 7 x weekly - 2 sets - 10 reps  - Seated Long Arc Quad  - 2 x daily - 7 x weekly - 2 sets - 10 reps - 2-3s hold

## 2024-08-05 ENCOUNTER — TREATMENT (OUTPATIENT)
Dept: PHYSICAL THERAPY | Facility: CLINIC | Age: 59
End: 2024-08-05
Payer: COMMERCIAL

## 2024-08-05 DIAGNOSIS — Z98.890 S/P LEFT KNEE ARTHROSCOPY: Primary | ICD-10-CM

## 2024-08-05 DIAGNOSIS — M25.562 LEFT KNEE PAIN, UNSPECIFIED CHRONICITY: ICD-10-CM

## 2024-08-05 PROCEDURE — 97110 THERAPEUTIC EXERCISES: CPT | Performed by: PHYSICAL THERAPIST

## 2024-08-05 PROCEDURE — 97530 THERAPEUTIC ACTIVITIES: CPT | Performed by: PHYSICAL THERAPIST

## 2024-08-05 NOTE — PROGRESS NOTES
Physical Therapy Daily Treatment Note  [facility] [place]   [address], [address]  [place] [address]  P: [phone]  F: [phone]    Patient: Valerie Avina   : 1965  Referring practitioner: Tia Phoenix MD  Date of Initial Visit: Type: THERAPY  Noted: 2024  Today's Date: 2024  Patient seen for 2 sessions       Visit Diagnoses:    ICD-10-CM ICD-9-CM   1. S/P left knee arthroscopy  Z98.890 V45.89   2. Left knee pain, unspecified chronicity  M25.562 719.46         Valerie Avina reports: she broke her L pinky toe on Saturday. Is preparing to leave for a trip at the end of the month where she will be on her feet walking a lot.    Subjective     Objective   See Exercise, Manual, and Modality Logs for complete treatment.     Bruising along base of L fifth metatarsal. Fifth and fourth toes taped together on L.      Assessment:  Pt tolerated today's treatment session well with no adverse responses during treatment session. Pt subjectively reported some pain with weight-bearing activities. PT advises pt to get x-rays later today which pt agreed to do. Today's session cut short due to pts recent injury. Pt will benefit from continued skilled PT services.       Plan:  Progress per POC.         Timed:         Manual Therapy:         mins  75304;     Therapeutic Exercise:    15     mins  27508;     Neuromuscular Chino:        mins  60604;    Therapeutic Activity:     10     mins  54844;     Gait Training:           mins  87585;     Ultrasound:          mins  00703;    Ionto                                   mins  20544  Self Care                            mins  79912  Traction          mins 34288      Un-Timed:  Canalith Repos         mins 64323  Electrical Stimulation:         mins  36841 ( );  Dry Needling          mins self-pay  Traction          mins 60623        Timed Treatment:   25   mins   Total Treatment:     25   mins    Valeriano Cain  PT  Temporary [place] Permit #: [id number]    Physical Therapist

## 2024-08-09 ENCOUNTER — TREATMENT (OUTPATIENT)
Dept: PHYSICAL THERAPY | Facility: CLINIC | Age: 59
End: 2024-08-09
Payer: COMMERCIAL

## 2024-08-09 DIAGNOSIS — M25.562 LEFT KNEE PAIN, UNSPECIFIED CHRONICITY: ICD-10-CM

## 2024-08-09 DIAGNOSIS — Z98.890 S/P LEFT KNEE ARTHROSCOPY: Primary | ICD-10-CM

## 2024-08-09 NOTE — PROGRESS NOTES
Physical Therapy Daily Treatment Note  [facility] [place]   [address], [address]  [place] [address]  P: [phone]  F: [phone]    Patient: Valerie Avina   : 1965  Referring practitioner: Tia Phoenix MD  Date of Initial Visit: Type: THERAPY  Noted: 2024  Today's Date: 2024  Patient seen for 3 sessions       Visit Diagnoses:    ICD-10-CM ICD-9-CM   1. S/P left knee arthroscopy  Z98.890 V45.89   2. Left knee pain, unspecified chronicity  M25.562 719.46         Valerie Avina reports: Confirmed with x-rays last week that she has a fractured L pinky toe. Has to wear a boot for four weeks.    Subjective     Objective   See Exercise, Manual, and Modality Logs for complete treatment.       Assessment:  Pt tolerated today's treatment session well with no adverse responses during treatment session. Pt continues to display limitations including L knee strength, and now has a fx L pinky toe and is wearing a boot. Pt will benefit from continued skilled PT services.       Plan:  Progress per POC.          Timed:         Manual Therapy:         mins  20158;     Therapeutic Exercise:    20     mins  02544;     Neuromuscular Chino:    10    mins  01318;    Therapeutic Activity:          mins  94754;     Gait Training:           mins  28737;     Ultrasound:          mins  97994;    Ionto                                   mins  59892  Self Care                            mins  26171  Traction          mins 75705      Un-Timed:  Canalith Repos         mins 66821  Electrical Stimulation:         mins  31879 ( );  Dry Needling          mins self-pay  Traction          mins 66840        Timed Treatment:   30   mins   Total Treatment:     30   mins    Valeriano Cain, PT  Temporary [place] Permit #: [id number]    Physical Therapist

## 2024-08-13 ENCOUNTER — TREATMENT (OUTPATIENT)
Dept: PHYSICAL THERAPY | Facility: CLINIC | Age: 59
End: 2024-08-13
Payer: COMMERCIAL

## 2024-08-13 DIAGNOSIS — M25.562 LEFT KNEE PAIN, UNSPECIFIED CHRONICITY: ICD-10-CM

## 2024-08-13 DIAGNOSIS — Z98.890 S/P LEFT KNEE ARTHROSCOPY: Primary | ICD-10-CM

## 2024-08-13 NOTE — PROGRESS NOTES
"Physical Therapy Daily Treatment Note  [facility] [place]   [address], [address]  [place] [address]  P: [phone]  F: [phone]    Patient: Valerie Avina   : 1965  Referring practitioner: Tia Phoenix MD  Date of Initial Visit: Type: THERAPY  Noted: 2024  Today's Date: 2024  Patient seen for 4 sessions       Visit Diagnoses:    ICD-10-CM ICD-9-CM   1. S/P left knee arthroscopy  Z98.890 V45.89   2. Left knee pain, unspecified chronicity  M25.562 719.46         Valerie Avina reports: her L knee feels a little \"tweaked\" [place]. She believes her boot may throw off the way she walks.    Subjective     Objective   See Exercise, Manual, and Modality Logs for complete treatment.       Assessment:  Pt tolerated today's treatment session well with no adverse responses during treatment session. Pt continues to display limitations including intolerance to Wbing exercises on L due to foot pain. Pt will benefit from continued skilled PT services.       Plan:  Progress per POC.         Timed:         Manual Therapy:         mins  61551;     Therapeutic Exercise:    20     mins  41300;     Neuromuscular Chino:    10    mins  91227;    Therapeutic Activity:     10     mins  69659;     Gait Training:           mins  18716;     Ultrasound:          mins  86682;    Ionto                                   mins  97848  Self Care                            mins  51321  Traction          mins 10877      Un-Timed:  Canalith Repos         mins 74129  Electrical Stimulation:         mins  56505 (MC );  Dry Needling          mins self-pay  Traction          mins 57085        Timed Treatment:   40   mins   Total Treatment:     40   mins    Valeriano Cain PT  Temporary [place] Permit #: [id number]    Physical Therapist      "

## 2024-08-15 ENCOUNTER — TREATMENT (OUTPATIENT)
Dept: PHYSICAL THERAPY | Facility: CLINIC | Age: 59
End: 2024-08-15
Payer: COMMERCIAL

## 2024-08-15 DIAGNOSIS — Z98.890 S/P LEFT KNEE ARTHROSCOPY: Primary | ICD-10-CM

## 2024-08-15 DIAGNOSIS — M25.562 LEFT KNEE PAIN, UNSPECIFIED CHRONICITY: ICD-10-CM

## 2024-08-15 NOTE — PROGRESS NOTES
Physical Therapy Daily Treatment Note  [facility] [place]   [address], [address]  [place] [address]  P: [phone]  F: [phone]    Patient: Valerie Avina   : 1965  Referring practitioner: Tia Phoenix MD  Date of Initial Visit: Type: THERAPY  Noted: 2024  Today's Date: 8/15/2024  Patient seen for 5 sessions       Visit Diagnoses:    ICD-10-CM ICD-9-CM   1. S/P left knee arthroscopy  Z98.890 V45.89   2. Left knee pain, unspecified chronicity  M25.562 719.46         Valerie Avina reports: she is still having some tenderness on he inside of her L knee which she believes is due to her boot throwing off her gait mechanics.    Subjective     Objective   See Exercise, Manual, and Modality Logs for complete treatment.       Assessment:  Pt tolerated today's treatment session well with no adverse responses during treatment session. Pt continues to display limitations including tolerance to WBing activities on her L due to her foot fx and boot. Pt will benefit from continued skilled PT services.       Plan:  Progress per POC.         Timed:         Manual Therapy:         mins  06806;     Therapeutic Exercise:    15     mins  19744;     Neuromuscular Chino:    10    mins  71567;    Therapeutic Activity:     15     mins  55189;     Gait Training:           mins  73984;     Ultrasound:          mins  85600;    Ionto                                   mins  03398  Self Care                            mins  58043  Traction          mins 04278      Un-Timed:  Canalith Repos         mins 89287  Electrical Stimulation:         mins  64970 (MC );  Dry Needling          mins self-pay  Traction          mins 52187        Timed Treatment:   40   mins   Total Treatment:     40   mins    Valeriano Cain, PT  Temporary [place] Permit #: [id number]    Physical Therapist

## 2024-08-20 ENCOUNTER — TREATMENT (OUTPATIENT)
Dept: PHYSICAL THERAPY | Facility: CLINIC | Age: 59
End: 2024-08-20
Payer: COMMERCIAL

## 2024-08-20 DIAGNOSIS — Z98.890 S/P LEFT KNEE ARTHROSCOPY: Primary | ICD-10-CM

## 2024-08-20 DIAGNOSIS — M25.562 LEFT KNEE PAIN, UNSPECIFIED CHRONICITY: ICD-10-CM

## 2024-08-20 NOTE — PROGRESS NOTES
"Physical Therapy Daily Treatment Note  [facility] [place]   [address], [address]  [place] [address]  P: [phone]  F: [phone]    Patient: Valerie Avina   : 1965  Referring practitioner: Tia Phoenix MD  Date of Initial Visit: Type: THERAPY  Noted: 2024  Today's Date: 2024  Patient seen for 6 sessions       Visit Diagnoses:    ICD-10-CM ICD-9-CM   1. S/P left knee arthroscopy  Z98.890 V45.89   2. Left knee pain, unspecified chronicity  M25.562 719.46         Valerie Avina reports: 5/10 pain level today due to \"overdoing it\" yesterday with housework.    Subjective     Objective   See Exercise, Manual, and Modality Logs for complete treatment.       Assessment:  Pt tolerated today's treatment session well with no adverse responses during treatment session. Pt continues to display limitations including tolerance to WBing activities on her L due to her foot fx and boot. Pt will benefit from continued skilled PT services.       Plan:  Progress per POC.         Timed:         Manual Therapy:         mins  93220;     Therapeutic Exercise:    30     mins  74659;     Neuromuscular Chino:    12    mins  71705;    Therapeutic Activity:          mins  92215;     Gait Training:           mins  31656;     Ultrasound:          mins  04834;    Ionto                                   mins  88676  Self Care                            mins  87714  Traction          mins 65434      Un-Timed:  Canalith Repos         mins 31751  Electrical Stimulation:         mins  00607 (MC );  Dry Needling          mins self-pay  Traction          mins 92030        Timed Treatment:   42   mins   Total Treatment:     42   mins    Valeriano Cain PT  [place] License #: [id number]    Physical Therapist      "

## 2024-08-22 ENCOUNTER — TREATMENT (OUTPATIENT)
Dept: PHYSICAL THERAPY | Facility: CLINIC | Age: 59
End: 2024-08-22
Payer: COMMERCIAL

## 2024-08-22 DIAGNOSIS — Z98.890 S/P LEFT KNEE ARTHROSCOPY: Primary | ICD-10-CM

## 2024-08-22 DIAGNOSIS — M25.562 LEFT KNEE PAIN, UNSPECIFIED CHRONICITY: ICD-10-CM

## 2024-08-22 NOTE — PROGRESS NOTES
Knee Scope follow Up       Patient: Valerie Avina        YOB: 1965      Chief Complaints: knee pain left      History of Present Illness: Pt is here f/u knee arthroscopy on the left and left foot pain.  Her knee is doing great she is happy with where she is she is getting ready to take a big trip to [place] and overall doing great.  She did catch her small toe on a toy while taking care of her grandson she was seen in urgent care found to have a fracture she was placed in a postop shoe this was about 3 weeks ago she has not yet had a follow-up x-ray        Allergies: No Known Allergies    Medications:   Home Medications:  Current Outpatient Medications on File Prior to Visit   Medication Sig    buPROPion XL (WELLBUTRIN XL) 300 MG 24 hr tablet Take 1 tablet by mouth Daily.    escitalopram (LEXAPRO) 10 MG tablet Take 1 tablet by mouth Daily. (Patient taking differently: Take 1 tablet by mouth Every Night.)    HYDROcodone-acetaminophen (NORCO) 5-325 MG per tablet Take 1 tablet by mouth Every 4 (Four) Hours As Needed for Severe Pain. (Patient not taking: Reported on 7/25/2024)    melatonin 5 MG tablet tablet Take 1 tablet by mouth Every Night.    Omega-3 Fatty Acids (FISH OIL) 1000 MG capsule capsule Take 1 capsule by mouth Daily With Breakfast. HOLD FOR SURGERY    zolpidem (AMBIEN) 10 MG tablet Take 1 tablet by mouth At Night As Needed. (Patient not taking: Reported on 7/25/2024)     No current facility-administered medications on file prior to visit.     Current Medications:  Scheduled Meds:  Continuous Infusions:No current facility-administered medications for this visit.    PRN Meds:.          Physical Exam: 59 y.o. female  General Appearance:    Alert, cooperative, in no acute distress                 There were no vitals filed for this visit.   Patient is alert and oriented ×3 no acute distress normal mood physical exam.  Physical exam of the knee, incisions looked good there is no erythema, calf  is soft and non-tender.  No sign or sx of DVT  Her toe shows normal alignment some residual ecchymosis    X-ray AP lateral bleak of the left foot were taken to evaluate her fracture have no comparative films she has a medial condyle fracture of the proximal phalanx of the fifth toe that is intra-articular the joint surface looks good    Assessment  S/P knee scope.  Overall doing well.  She can continue to progress her activities as far as her toe goes I think the fracture looks fine she can fidelina tape this wean out of the postop shoe as her symptoms allow        Plan: Continue with strengthening, progression of activities as above

## 2024-08-22 NOTE — PROGRESS NOTES
Physical Therapy Daily Treatment Note  [facility] [place]   [address], [address]  [place] [address]  P: [phone]  F: [phone]    Patient: Valerie Avina   : 1965  Referring practitioner: Tai Phoenix MD  Date of Initial Visit: Type: THERAPY  Noted: 2024  Today's Date: 2024  Patient seen for 7 sessions       Visit Diagnoses:    ICD-10-CM ICD-9-CM   1. S/P left knee arthroscopy  Z98.890 V45.89   2. Left knee pain, unspecified chronicity  M25.562 719.46         Valerie Avina reports: she continues to experience some knee soreness following cleaning. Feels better following her PT sessions.    Subjective     Objective   See Exercise, Manual, and Modality Logs for complete treatment.       Assessment:  Pt tolerated today's treatment session well with no adverse responses during treatment session. Pt continues to display limitations including tolerance to L LE exercises in weight-bearing due to post-op pain and fx L foot. Pt continues to feel benefit following therapy sessions. Pt will benefit from continued skilled PT services.       Plan:  Progress per POC.         Timed:         Manual Therapy:         mins  96980;     Therapeutic Exercise:    20     mins  63980;     Neuromuscular Chino:    8    mins  25509;    Therapeutic Activity:     10     mins  71189;     Gait Training:           mins  65646;     Ultrasound:          mins  83338;    Ionto                                   mins  36410  Self Care                            mins  13328  Traction          mins 91651      Un-Timed:  Canalith Repos         mins 36773  Electrical Stimulation:         mins  06085 (MC );  Dry Needling          mins self-pay  Traction          mins 07185        Timed Treatment:   38   mins   Total Treatment:     38   mins    Valeriano Cain PT  [place] License #: [id number]    Physical Therapist

## 2024-08-26 ENCOUNTER — OFFICE VISIT (OUTPATIENT)
Dept: ORTHOPEDIC SURGERY | Facility: CLINIC | Age: 59
End: 2024-08-26
Payer: COMMERCIAL

## 2024-08-26 VITALS — WEIGHT: 180 LBS | HEIGHT: 66 IN | TEMPERATURE: 98.2 F | BODY MASS INDEX: 28.93 KG/M2

## 2024-08-26 DIAGNOSIS — Z98.890 S/P ARTHROSCOPY OF KNEE: Primary | ICD-10-CM

## 2024-08-26 DIAGNOSIS — R52 PAIN: ICD-10-CM

## 2024-08-26 RX ORDER — MELOXICAM 15 MG/1
TABLET ORAL
Qty: 30 TABLET | Refills: 0 | Status: SHIPPED | OUTPATIENT
Start: 2024-08-26

## 2024-09-16 ENCOUNTER — TELEPHONE (OUTPATIENT)
Dept: ORTHOPEDIC SURGERY | Facility: CLINIC | Age: 59
End: 2024-09-16

## 2024-09-16 ENCOUNTER — OFFICE VISIT (OUTPATIENT)
Dept: ORTHOPEDIC SURGERY | Facility: CLINIC | Age: 59
End: 2024-09-16
Payer: COMMERCIAL

## 2024-09-16 VITALS — BODY MASS INDEX: 28.9 KG/M2 | HEIGHT: 66 IN | TEMPERATURE: 98.2 F | WEIGHT: 179.8 LBS

## 2024-09-16 DIAGNOSIS — Z98.890 S/P ARTHROSCOPY OF KNEE: Primary | ICD-10-CM

## 2024-09-16 PROCEDURE — 99024 POSTOP FOLLOW-UP VISIT: CPT | Performed by: ORTHOPAEDIC SURGERY

## 2024-09-16 NOTE — TELEPHONE ENCOUNTER
Provider: WILL    Caller: PATIENT    Phone Number: [phone]    Reason for Call: PATIENT STATES THAT DR. SOLIS WANTED TO BRING HER BACK ON 10/07/24 TO DO AN INJECTION. SHE STATES SHE ISN'T SURE WHAT KIND OF INJECTION SHE WAS TALKING ABOUT, BUT WOULD LIKE TO SCHEDULE.

## 2024-09-17 ENCOUNTER — TREATMENT (OUTPATIENT)
Dept: PHYSICAL THERAPY | Facility: CLINIC | Age: 59
End: 2024-09-17
Payer: COMMERCIAL

## 2024-09-17 DIAGNOSIS — M25.562 LEFT KNEE PAIN, UNSPECIFIED CHRONICITY: ICD-10-CM

## 2024-09-17 DIAGNOSIS — Z98.890 S/P LEFT KNEE ARTHROSCOPY: Primary | ICD-10-CM

## 2024-09-19 ENCOUNTER — TREATMENT (OUTPATIENT)
Dept: PHYSICAL THERAPY | Facility: CLINIC | Age: 59
End: 2024-09-19
Payer: COMMERCIAL

## 2024-09-19 ENCOUNTER — OFFICE VISIT (OUTPATIENT)
Dept: INTERNAL MEDICINE | Facility: CLINIC | Age: 59
End: 2024-09-19
Payer: COMMERCIAL

## 2024-09-19 VITALS
HEART RATE: 70 BPM | DIASTOLIC BLOOD PRESSURE: 80 MMHG | HEIGHT: 66 IN | TEMPERATURE: 98.5 F | SYSTOLIC BLOOD PRESSURE: 114 MMHG | OXYGEN SATURATION: 99 % | WEIGHT: 181.3 LBS | BODY MASS INDEX: 29.14 KG/M2

## 2024-09-19 DIAGNOSIS — Z98.890 S/P LEFT KNEE ARTHROSCOPY: Primary | ICD-10-CM

## 2024-09-19 DIAGNOSIS — F41.9 ANXIETY: Primary | ICD-10-CM

## 2024-09-19 DIAGNOSIS — M25.562 LEFT KNEE PAIN, UNSPECIFIED CHRONICITY: ICD-10-CM

## 2024-09-19 PROCEDURE — 99213 OFFICE O/P EST LOW 20 MIN: CPT | Performed by: NURSE PRACTITIONER

## 2024-09-19 RX ORDER — ESCITALOPRAM OXALATE 10 MG/1
15 TABLET ORAL DAILY
Qty: 135 TABLET | Refills: 3 | Status: SHIPPED | OUTPATIENT
Start: 2024-09-19

## 2024-09-23 RX ORDER — MELOXICAM 15 MG/1
TABLET ORAL
Qty: 30 TABLET | Refills: 0 | Status: SHIPPED | OUTPATIENT
Start: 2024-09-23

## 2024-09-26 ENCOUNTER — TREATMENT (OUTPATIENT)
Dept: PHYSICAL THERAPY | Facility: CLINIC | Age: 59
End: 2024-09-26
Payer: COMMERCIAL

## 2024-09-26 DIAGNOSIS — Z98.890 S/P LEFT KNEE ARTHROSCOPY: Primary | ICD-10-CM

## 2024-09-26 DIAGNOSIS — M25.562 LEFT KNEE PAIN, UNSPECIFIED CHRONICITY: ICD-10-CM

## 2024-10-04 NOTE — PROGRESS NOTES
Patient: Valerie Avina  YOB: 1965  Date of Service: 10/4/2024    Chief Complaints:   Left knee pain  Subjective:    History of Present Illness: Pt is seen in the office today with complaints of left knee pain she is status post knee arthroscopy she did have some degenerative changes she is actually doing well she is much better than she was for surgery still had a little bit of achiness we talked about her coming in this week to do an injection as we did do 1 at a time of surgery and she is getting ready to go on a big trip        Allergies: No Known Allergies    Medications:   Home Medications:  Current Outpatient Medications on File Prior to Visit   Medication Sig    buPROPion XL (WELLBUTRIN XL) 300 MG 24 hr tablet Take 1 tablet by mouth Daily.    escitalopram (LEXAPRO) 10 MG tablet Take 1.5 tablets by mouth Daily.    melatonin 5 MG tablet tablet Take 1 tablet by mouth At Night As Needed (Sleep).    meloxicam (MOBIC) 15 MG tablet TAKE 1 TABLET BY MOUTH EVERY DAY WITH FOOD    Omega-3 Fatty Acids (FISH OIL) 1000 MG capsule capsule Take 1 capsule by mouth Daily With Breakfast. HOLD FOR SURGERY    zolpidem (AMBIEN) 10 MG tablet Take 1 tablet by mouth At Night As Needed. (Patient not taking: Reported on 9/16/2024)     No current facility-administered medications on file prior to visit.     Current Medications:  Scheduled Meds:  Continuous Infusions:No current facility-administered medications for this visit.    PRN Meds:.    I have reviewed the patient's medical history in detail and updated the computerized patient record.  Review and summarization of old records include:    Past Medical History:   Diagnosis Date    Ankle sprain 2weeks ago    Anxiety and depression     Fracture of ankle 2022    Fracture of wrist 20 years ago    Fracture, foot 2022    Insomnia     Sleep apnea     CPAP    Tear meniscus knee     LEFT:  PAIN, WEAKNESS, LIMITED MOBILITY, SWELLING        Past Surgical History:   Procedure  Laterality Date    ANKLE OPEN REDUCTION INTERNAL FIXATION Right 2022    METAL    BREAST AUGMENTATION      COLONOSCOPY N/A 05/16/2022    Procedure: COLONOSCOPY;  Surgeon: Khadra Sanchez MD;  Location: [facility] OR;  Service: Gastroenterology;  Laterality: N/A;  mild diverticulosis    FOOT SURGERY Left     GASTRECTOMY      HYSTERECTOMY      KNEE ARTHROSCOPY Right     KNEE ARTHROSCOPY Left 7/10/2024    Procedure: KNEE ARTHROSCOPY PARTIAL LATERAL MENISCECTOMY AND DEBRIDEMENT OF ARTHRITIS;  Surgeon: Tia Phoenix MD;  Location: [facility];  Service: Orthopedics;  Laterality: Left;    NASAL SEPTUM SURGERY          Social History     Occupational History    Not on file   Tobacco Use    Smoking status: Never     Passive exposure: Yes    Smokeless tobacco: Never    Tobacco comments:     None   Vaping Use    Vaping status: Never Used   Substance and Sexual Activity    Alcohol use: Yes     Comment: SOCIAL    Drug use: No    Sexual activity: Yes     Partners: Female     Birth control/protection: None, Partner of same sex     Comment: Menapause      Social History     Social History Narrative    Not on file        Family History   Problem Relation Age of Onset    Cancer Mother     Heart failure Father     Malig Hyperthermia Neg Hx        ROS: 14 point review of systems was performed and was negative except for documented findings in HPI and today's encounter.     Allergies: No Known Allergies  Constitutional:  Denies fever, shaking or chills   Eyes:  Denies change in visual acuity   HENT:  Denies nasal congestion or sore throat   Respiratory:  Denies cough or shortness of breath   Cardiovascular:  Denies chest pain or severe LE edema   GI:  Denies abdominal pain, nausea, vomiting, bloody stools or diarrhea   Musculoskeletal:  Numbness, tingling, or loss of motor function only as noted above in history of present illness.  : Denies painful urination or hematuria  Integument:  Denies rash, lesion or ulceration    Neurologic:  Denies headache or focal weakness  Endocrine:  Denies lymphadenopathy  Psych:  Denies confusion or change in mental status   Hem:  Denies active bleeding      Physical Exam: 59 y.o. female  Wt Readings from Last 3 Encounters:   09/19/24 82.2 kg (181 lb 4.8 oz)   09/16/24 81.6 kg (179 lb 12.8 oz)   08/26/24 81.6 kg (180 lb)       There is no height or weight on file to calculate BMI.    There were no vitals filed for this visit.  Vital signs reviewed.   General Appearance:    Alert, cooperative, in no acute distress                    Ortho exam      Right knee no effusion no redness healed surgical incisions good range of motion           Assessment: Status post knee arthroscopy with some residual pain    Plan: Injection  Follow up as indicated.  Ice, elevate, and rest as needed.  Discussed conservative measures of pain control including ice, bracing.  Also talked about the importance of strengthening   Tia Phoenix M.D.    Large Joint Arthrocentesis: L knee  Date/Time: 10/7/2024 10:30 AM  Consent given by: patient  Site marked: site marked  Timeout: Immediately prior to procedure a time out was called to verify the correct patient, procedure, equipment, support staff and site/side marked as required   Supporting Documentation  Indications: pain   Procedure Details  Location: knee - L knee  Preparation: Patient was prepped and draped in the usual sterile fashion  Needle gauge: 21G.  Medications administered: 1 mL methylPREDNISolone acetate 80 MG/ML; 2 mL lidocaine PF 1% 1 %  Patient tolerance: patient tolerated the procedure well with no immediate complications

## 2024-10-07 ENCOUNTER — OFFICE VISIT (OUTPATIENT)
Dept: ORTHOPEDIC SURGERY | Facility: CLINIC | Age: 59
End: 2024-10-07
Payer: COMMERCIAL

## 2024-10-07 VITALS — TEMPERATURE: 98.2 F | BODY MASS INDEX: 28.75 KG/M2 | HEIGHT: 66 IN | WEIGHT: 178.9 LBS

## 2024-10-07 DIAGNOSIS — Z98.890 STATUS POST ARTHROSCOPY OF KNEE: Primary | ICD-10-CM

## 2024-10-07 RX ORDER — LIDOCAINE HYDROCHLORIDE 10 MG/ML
2 INJECTION, SOLUTION EPIDURAL; INFILTRATION; INTRACAUDAL; PERINEURAL
Status: COMPLETED | OUTPATIENT
Start: 2024-10-07 | End: 2024-10-07

## 2024-10-07 RX ORDER — METHYLPREDNISOLONE ACETATE 80 MG/ML
1 INJECTION, SUSPENSION INTRA-ARTICULAR; INTRALESIONAL; INTRAMUSCULAR; SOFT TISSUE
Status: COMPLETED | OUTPATIENT
Start: 2024-10-07 | End: 2024-10-07

## 2024-10-07 RX ADMIN — METHYLPREDNISOLONE ACETATE 1 ML: 80 INJECTION, SUSPENSION INTRA-ARTICULAR; INTRALESIONAL; INTRAMUSCULAR; SOFT TISSUE at 10:30

## 2024-10-07 RX ADMIN — LIDOCAINE HYDROCHLORIDE 2 ML: 10 INJECTION, SOLUTION EPIDURAL; INFILTRATION; INTRACAUDAL; PERINEURAL at 10:30

## 2024-10-21 RX ORDER — MELOXICAM 15 MG/1
TABLET ORAL
Qty: 30 TABLET | Refills: 0 | Status: SHIPPED | OUTPATIENT
Start: 2024-10-21

## 2024-11-18 DIAGNOSIS — Z98.890 STATUS POST ARTHROSCOPY OF KNEE: Primary | ICD-10-CM

## 2024-11-18 RX ORDER — MELOXICAM 15 MG/1
TABLET ORAL
Qty: 30 TABLET | Refills: 0 | Status: SHIPPED | OUTPATIENT
Start: 2024-11-18

## 2024-11-25 ENCOUNTER — OFFICE VISIT (OUTPATIENT)
Dept: INTERNAL MEDICINE | Facility: CLINIC | Age: 59
End: 2024-11-25
Payer: COMMERCIAL

## 2024-11-25 VITALS
OXYGEN SATURATION: 95 % | BODY MASS INDEX: 28.12 KG/M2 | SYSTOLIC BLOOD PRESSURE: 100 MMHG | WEIGHT: 175 LBS | HEIGHT: 66 IN | TEMPERATURE: 99 F | HEART RATE: 79 BPM | DIASTOLIC BLOOD PRESSURE: 60 MMHG

## 2024-11-25 DIAGNOSIS — R19.05 PERIUMBILICAL MASS: ICD-10-CM

## 2024-11-25 DIAGNOSIS — J06.9 ACUTE URI: Primary | ICD-10-CM

## 2024-11-25 DIAGNOSIS — R05.9 COUGH, UNSPECIFIED TYPE: ICD-10-CM

## 2024-11-25 LAB
EXPIRATION DATE: NORMAL
FLUAV AG UPPER RESP QL IA.RAPID: NOT DETECTED
FLUBV AG UPPER RESP QL IA.RAPID: NOT DETECTED
INTERNAL CONTROL: NORMAL
Lab: NORMAL
SARS-COV-2 AG UPPER RESP QL IA.RAPID: NOT DETECTED

## 2024-11-25 PROCEDURE — 87428 SARSCOV & INF VIR A&B AG IA: CPT | Performed by: NURSE PRACTITIONER

## 2024-11-25 PROCEDURE — 99213 OFFICE O/P EST LOW 20 MIN: CPT | Performed by: NURSE PRACTITIONER

## 2024-11-25 RX ORDER — AZITHROMYCIN 250 MG/1
TABLET, FILM COATED ORAL
Qty: 6 TABLET | Refills: 0 | Status: SHIPPED | OUTPATIENT
Start: 2024-11-25

## 2024-11-25 NOTE — PROGRESS NOTES
Subjective   Valerie Avina is a 59 y.o. female. Patient is here today for   Chief Complaint   Patient presents with    Headache    Sore Throat           Generalized Body Aches    URI   .    History of Present Illness   C/o knot to abdomen for about 3 weeks. Denies any pain.     C/o sinus congestion for a few days associated with fever. Fever as high as 101F for the first 2 days. Cough, loss of voice, post-nasal drainage. She has had some shortness of breath.   Emergen-C, increased fluids.     The following portions of the patient's history were reviewed and updated as appropriate: allergies, current medications, past family history, past medical history, past social history, past surgical history and problem list.    Review of Systems    Objective   Vitals:    11/25/24 0834   BP: 100/60   Pulse: 79   Temp: 99 °F (37.2 °C)   SpO2: 95%     Body mass index is 28.26 kg/m².  Physical Exam  Vitals and nursing note reviewed.   Constitutional:       Appearance: Normal appearance. She is well-developed.   HENT:      Right Ear: Ear canal normal. A middle ear effusion is present.      Left Ear: Ear canal normal. A middle ear effusion is present.      Mouth/Throat:      Pharynx: Posterior oropharyngeal erythema present.   Cardiovascular:      Rate and Rhythm: Normal rate and regular rhythm.      Heart sounds: Normal heart sounds.   Pulmonary:      Effort: Pulmonary effort is normal.      Breath sounds: Normal breath sounds.   Abdominal:          Comments: Small palpable mass   Lymphadenopathy:      Cervical:      Right cervical: Superficial cervical adenopathy present.      Left cervical: Superficial cervical adenopathy present.   Skin:     General: Skin is warm and dry.   Neurological:      Mental Status: She is alert and oriented to person, place, and time.   Psychiatric:         Speech: Speech normal.         Behavior: Behavior normal.         Thought Content: Thought content normal.       Results for orders placed or  performed in visit on 11/25/24   POCT SARS-CoV-2 + Flu Antigen EMILI    Collection Time: 11/25/24  8:54 AM    Specimen: Swab   Result Value Ref Range    SARS Antigen Not Detected Not Detected, Presumptive Negative    Influenza A Antigen EMILI Not Detected Not Detected    Influenza B Antigen EMILI Not Detected Not Detected    Internal Control Passed Passed    Lot Number [id number]     Expiration Date 9,042,025        Assessment & Plan   Diagnoses and all orders for this visit:    1. Acute URI (Primary)  -     azithromycin (Zithromax Z-Gage) 250 MG tablet; Take 2 tablets by mouth on day 1, then 1 tablet daily on days 2-5  Dispense: 6 tablet; Refill: 0    2. Cough, unspecified type  -     POCT SARS-CoV-2 + Flu Antigen EMILI    3. Periumbilical mass  -     US abdomen limited; Future      Sinusitis - patient will be treated with zithromax since her symptoms are not improving.     Mass - will evaluate with ultrasound. Likely a lipoma.

## 2024-12-04 ENCOUNTER — TELEPHONE (OUTPATIENT)
Dept: INTERNAL MEDICINE | Facility: CLINIC | Age: 59
End: 2024-12-04
Payer: COMMERCIAL

## 2024-12-04 NOTE — TELEPHONE ENCOUNTER
PATIENT STATES SHE'S HAVING THE ROX SYMPTOMS SHE WAS SEEN FOR, SHE'S NOT IMPROVING AFTER MEDICATION. PLEASE ADVISE, HAS TERRIBLE COUGH .

## 2024-12-05 ENCOUNTER — HOSPITAL ENCOUNTER (OUTPATIENT)
Dept: ULTRASOUND IMAGING | Facility: HOSPITAL | Age: 59
Discharge: HOME | End: 2024-12-05
Admitting: NURSE PRACTITIONER
Payer: COMMERCIAL

## 2024-12-05 DIAGNOSIS — R19.05 PERIUMBILICAL MASS: ICD-10-CM

## 2024-12-05 PROCEDURE — 76705 ECHO EXAM OF ABDOMEN: CPT

## 2024-12-10 DIAGNOSIS — R73.03 PREDIABETES: ICD-10-CM

## 2024-12-10 DIAGNOSIS — Z00.00 HEALTHCARE MAINTENANCE: ICD-10-CM

## 2024-12-12 LAB
ALBUMIN SERPL-MCNC: 4 G/DL (ref 3.5–5.2)
ALBUMIN/GLOB SERPL: 1.5 G/DL
ALP SERPL-CCNC: 58 U/L (ref 39–117)
ALT SERPL-CCNC: 21 U/L (ref 1–33)
AST SERPL-CCNC: 16 U/L (ref 1–32)
BILIRUB SERPL-MCNC: 0.2 MG/DL (ref 0–1.2)
BUN SERPL-MCNC: 16 MG/DL (ref 6–20)
BUN/CREAT SERPL: 21.3 (ref 7–25)
CALCIUM SERPL-MCNC: 9.3 MG/DL (ref 8.6–10.5)
CHLORIDE SERPL-SCNC: 108 MMOL/L (ref 98–107)
CHOLEST SERPL-MCNC: 236 MG/DL (ref 0–200)
CHOLEST/HDLC SERPL: 4 {RATIO}
CO2 SERPL-SCNC: 25.1 MMOL/L (ref 22–29)
CREAT SERPL-MCNC: 0.75 MG/DL (ref 0.57–1)
EGFRCR SERPLBLD CKD-EPI 2021: 92.4 ML/MIN/1.73
GLOBULIN SER CALC-MCNC: 2.6 GM/DL
GLUCOSE SERPL-MCNC: 104 MG/DL (ref 65–99)
HBA1C MFR BLD: 5.7 % (ref 4.8–5.6)
HDLC SERPL-MCNC: 59 MG/DL (ref 40–60)
LDLC SERPL CALC-MCNC: 158 MG/DL (ref 0–100)
POTASSIUM SERPL-SCNC: 4.5 MMOL/L (ref 3.5–5.2)
PROT SERPL-MCNC: 6.6 G/DL (ref 6–8.5)
SODIUM SERPL-SCNC: 142 MMOL/L (ref 136–145)
TRIGL SERPL-MCNC: 108 MG/DL (ref 0–150)
VLDLC SERPL CALC-MCNC: 19 MG/DL (ref 5–40)

## 2024-12-17 ENCOUNTER — OFFICE VISIT (OUTPATIENT)
Dept: INTERNAL MEDICINE | Facility: CLINIC | Age: 59
End: 2024-12-17
Payer: COMMERCIAL

## 2024-12-17 VITALS
BODY MASS INDEX: 28.09 KG/M2 | DIASTOLIC BLOOD PRESSURE: 80 MMHG | HEART RATE: 74 BPM | OXYGEN SATURATION: 99 % | HEIGHT: 66 IN | WEIGHT: 174.8 LBS | SYSTOLIC BLOOD PRESSURE: 98 MMHG

## 2024-12-17 DIAGNOSIS — R73.03 PREDIABETES: Primary | ICD-10-CM

## 2024-12-17 DIAGNOSIS — F41.9 ANXIETY: ICD-10-CM

## 2024-12-17 DIAGNOSIS — Z23 NEED FOR INFLUENZA VACCINATION: ICD-10-CM

## 2024-12-17 DIAGNOSIS — E78.2 MODERATE MIXED HYPERLIPIDEMIA NOT REQUIRING STATIN THERAPY: ICD-10-CM

## 2024-12-17 DIAGNOSIS — G47.00 INSOMNIA, UNSPECIFIED TYPE: ICD-10-CM

## 2024-12-17 PROCEDURE — 90471 IMMUNIZATION ADMIN: CPT | Performed by: NURSE PRACTITIONER

## 2024-12-17 PROCEDURE — 90656 IIV3 VACC NO PRSV 0.5 ML IM: CPT | Performed by: NURSE PRACTITIONER

## 2024-12-17 PROCEDURE — 99214 OFFICE O/P EST MOD 30 MIN: CPT | Performed by: NURSE PRACTITIONER

## 2024-12-17 RX ORDER — ESCITALOPRAM OXALATE 10 MG/1
15 TABLET ORAL DAILY
Qty: 135 TABLET | Refills: 3 | Status: SHIPPED | OUTPATIENT
Start: 2024-12-17

## 2024-12-17 RX ORDER — ZOLPIDEM TARTRATE 10 MG/1
10 TABLET ORAL NIGHTLY PRN
Qty: 90 TABLET | Refills: 0 | Status: SHIPPED | OUTPATIENT
Start: 2024-12-17

## 2024-12-17 NOTE — PROGRESS NOTES
Subjective   Valerie Avina is a 59 y.o. female. Patient is here today for   Chief Complaint   Patient presents with    Follow-up     Patient is here for 6 month follow up, with labs done prior.     Prediabetes    Hyperlipidemia   .    History of Present Illness   Patient is here to follow-up on prediabetes.    Patient is here to follow-up on anxiety.  She is currently on Lexapro 50 mg daily.  States that she is feeling much better.    Patient has insomnia and takes Ambien as needed.  This was previously prescribed by her gynecologist.  Patient is requesting this to be filled in this office.    The following portions of the patient's history were reviewed and updated as appropriate: allergies, current medications, past family history, past medical history, past social history, past surgical history and problem list.    Review of Systems    Objective     Vitals:    12/17/24 0736   BP: 98/80   Pulse: 74   SpO2: 99%     Body mass index is 28.23 kg/m².    Orders Only on 12/10/2024   Component Date Value Ref Range Status    Hemoglobin A1C 12/11/2024 5.70 (H)  4.80 - 5.60 % Final    Comment: Hemoglobin A1C Ranges:  Increased Risk for Diabetes  5.7% to 6.4%  Diabetes                     >= 6.5%  Diabetic Goal                < 7.0%      Total Cholesterol 12/11/2024 236 (H)  0 - 200 mg/dL Final    Comment: Cholesterol Reference Ranges  (U.S. Department of Health and Human Services ATP III  Classifications)  Desirable          <200 mg/dL  Borderline High    200-239 mg/dL  High Risk          >240 mg/dL  Triglyceride Reference Ranges  (U.S. Department of Health and Human Services ATP III  Classifications)  Normal           <150 mg/dL  Borderline High  150-199 mg/dL  High             200-499 mg/dL  Very High        >500 mg/dL  HDL Reference Ranges  (U.S. Department of Health and Human Services ATP III  Classifications)  Low     <40 mg/dl (major risk factor for CHD)  High    >60 mg/dl ('negative' risk factor for CHD)  LDL  Reference Ranges  (U.S. Department of Health and Human Services ATP III  Classifications)  Optimal          <100 mg/dL  Near Optimal     100-129 mg/dL  Borderline High  130-159 mg/dL  High             160-189 mg/dL  Very High        >189 mg/dL      Triglycerides 12/11/2024 108  0 - 150 mg/dL Final    HDL Cholesterol 12/11/2024 59  40 - 60 mg/dL Final    VLDL Cholesterol Braulio 12/11/2024 19  5 - 40 mg/dL Final    LDL Chol Calc (NIH) 12/11/2024 158 (H)  0 - 100 mg/dL Final    Chol/HDL Ratio 12/11/2024 4.00   Final    Glucose 12/11/2024 104 (H)  65 - 99 mg/dL Final    BUN 12/11/2024 16  6 - 20 mg/dL Final    Creatinine 12/11/2024 0.75  0.57 - 1.00 mg/dL Final    EGFR Result 12/11/2024 92.4  >60.0 mL/min/1.73 Final    Comment: GFR Categories in Chronic Kidney Disease (CKD)/X09/  /X09/  GFR Category          GFR (mL/min/1.73)    Interpretation  G1/X09/                    90 or greater/X09/        Normal or high  (1)  G2//                    60-89                Mild decrease  (1)  G3a                   45-59                Mild to moderate  decrease  G3b                   30-44                Moderate to  severe decrease  G4                    15-29                Severe decrease  G5                    14 or less           Kidney failure//  /[id number]/  (1)In the absence of evidence of kidney disease, neither  GFR category G1 or G2 fulfill the criteria for CKD.  eGFR calculation 2021 CKD-EPI creatinine equation, which  does not include race as a factor      BUN/Creatinine Ratio 12/11/2024 21.3  7.0 - 25.0 Final    Sodium 12/11/2024 142  136 - 145 mmol/L Final    Potassium 12/11/2024 4.5  3.5 - 5.2 mmol/L Final    Chloride 12/11/2024 108 (H)  98 - 107 mmol/L Final    Total CO2 12/11/2024 25.1  22.0 - 29.0 mmol/L Final    Calcium 12/11/2024 9.3  8.6 - 10.5 mg/dL Final    Total Protein 12/11/2024 6.6  6.0 - 8.5 g/dL Final    Albumin 12/11/2024 4.0  3.5 - 5.2 g/dL Final    Globulin 12/11/2024 2.6  gm/dL Final    A/G  Ratio 12/11/2024 1.5  g/dL Final    Total Bilirubin 12/11/2024 0.2  0.0 - 1.2 mg/dL Final    Alkaline Phosphatase 12/11/2024 58  39 - 117 U/L Final    AST (SGOT) 12/11/2024 16  1 - 32 U/L Final    ALT (SGPT) 12/11/2024 21  1 - 33 U/L Final     Reviewed labs with patient.     The 10-year ASCVD risk score (Zacarias VILA, et al., 2019) is: 1.8%    Values used to calculate the score:      Age: 59 years      Sex: Female      Is Non- : No      Diabetic: No      Tobacco smoker: No      Systolic Blood Pressure: 98 mmHg      Is BP treated: No      HDL Cholesterol: 59 mg/dL      Total Cholesterol: 236 mg/dL    Physical Exam  Vitals and nursing note reviewed.   Constitutional:       Appearance: Normal appearance. She is well-developed.   Cardiovascular:      Rate and Rhythm: Normal rate and regular rhythm.      Heart sounds: Normal heart sounds.   Pulmonary:      Effort: Pulmonary effort is normal.      Breath sounds: Normal breath sounds.   Skin:     General: Skin is warm and dry.   Neurological:      Mental Status: She is alert and oriented to person, place, and time.   Psychiatric:         Speech: Speech normal.         Behavior: Behavior normal.         Thought Content: Thought content normal.         Assessment & Plan   Diagnoses and all orders for this visit:    1. Prediabetes (Primary)    2. Anxiety  -     escitalopram (LEXAPRO) 10 MG tablet; Take 1.5 tablets by mouth Daily.  Dispense: 135 tablet; Refill: 3    3. Insomnia, unspecified type  -     zolpidem (AMBIEN) 10 MG tablet; Take 1 tablet by mouth At Night As Needed for Sleep.  Dispense: 90 tablet; Refill: 0    4. Moderate mixed hyperlipidemia not requiring statin therapy    Prediabetes -hemoglobin A1c has improved slightly to 5.7%.  Decrease simple carbs and sugars in diet    Anxiety -continue Lexapro 15 mg daily    Insomnia -describe Ambien 10 mg nightly as needed.  Alex reviewed    Hyperlipidemia -nicolle dietary changes, such as increasing  fiber diet, decreasing red meat.           Current Outpatient Medications:     buPROPion XL (WELLBUTRIN XL) 300 MG 24 hr tablet, Take 1 tablet by mouth Daily., Disp: , Rfl:     escitalopram (LEXAPRO) 10 MG tablet, Take 1.5 tablets by mouth Daily., Disp: 135 tablet, Rfl: 3    melatonin 5 MG tablet tablet, Take 1 tablet by mouth At Night As Needed (Sleep)., Disp: , Rfl:     meloxicam (MOBIC) 15 MG tablet, TAKE 1 TABLET BY MOUTH EVERY DAY WITH FOOD, Disp: 30 tablet, Rfl: 0    Omega-3 Fatty Acids (FISH OIL) 1000 MG capsule capsule, Take 1 capsule by mouth Daily With Breakfast. HOLD FOR SURGERY, Disp: , Rfl:     zolpidem (AMBIEN) 10 MG tablet, Take 1 tablet by mouth At Night As Needed for Sleep., Disp: 90 tablet, Rfl: 0

## 2024-12-27 DIAGNOSIS — Z98.890 STATUS POST ARTHROSCOPY OF KNEE: ICD-10-CM

## 2024-12-27 RX ORDER — MELOXICAM 15 MG/1
TABLET ORAL
Qty: 30 TABLET | Refills: 0 | OUTPATIENT
Start: 2024-12-27

## 2024-12-27 NOTE — TELEPHONE ENCOUNTER
Spoke with patient and stated per Dr. Phoenix; that we do no do long term medication. If patient feels that she needs to still take Meloxciam she will need to get a new prescription with her PCP so they can follow patient kidney function.

## 2024-12-27 NOTE — TELEPHONE ENCOUNTER
We really do not do long-term anti-inflammatories here.  If she feels like she needs more long-term we would have her request them from her PCP as they can follow her kidney function

## 2025-02-03 ENCOUNTER — OFFICE VISIT (OUTPATIENT)
Dept: INTERNAL MEDICINE | Facility: CLINIC | Age: 60
End: 2025-02-03
Payer: COMMERCIAL

## 2025-02-03 VITALS
WEIGHT: 178 LBS | HEIGHT: 66 IN | BODY MASS INDEX: 28.61 KG/M2 | SYSTOLIC BLOOD PRESSURE: 108 MMHG | OXYGEN SATURATION: 98 % | DIASTOLIC BLOOD PRESSURE: 72 MMHG | HEART RATE: 65 BPM

## 2025-02-03 DIAGNOSIS — M54.50 ACUTE LEFT-SIDED LOW BACK PAIN, UNSPECIFIED WHETHER SCIATICA PRESENT: Primary | ICD-10-CM

## 2025-02-03 DIAGNOSIS — G57.02 PIRIFORMIS SYNDROME OF LEFT SIDE: ICD-10-CM

## 2025-02-03 PROCEDURE — 99214 OFFICE O/P EST MOD 30 MIN: CPT | Performed by: NURSE PRACTITIONER

## 2025-02-03 RX ORDER — CYCLOBENZAPRINE HCL 5 MG
5 TABLET ORAL NIGHTLY PRN
Qty: 30 TABLET | Refills: 0 | Status: SHIPPED | OUTPATIENT
Start: 2025-02-03

## 2025-02-03 RX ORDER — PREDNISONE 20 MG/1
TABLET ORAL
Qty: 18 TABLET | Refills: 0 | Status: SHIPPED | OUTPATIENT
Start: 2025-02-03

## 2025-02-03 NOTE — PROGRESS NOTES
Subjective   Valerie Avina is a 59 y.o. female.     History of Present Illness    The patient is here today with c/o left sided low back pain. She over did it last Monday, the next day was in pain. In one day cleaned the house, car and leaves. This has never happened before. The pain is left low back that radiates around to the front of her left hip. Hurts to change positions. She has taken tylenol only.     The following portions of the patient's history were reviewed and updated as appropriate: allergies, current medications, past family history, past medical history, past social history, past surgical history and problem list.    Review of Systems   Constitutional: Negative.    Respiratory: Negative.     Cardiovascular: Negative.    Gastrointestinal:  Positive for abdominal pain.   Genitourinary:  Positive for pelvic pain.   Musculoskeletal:  Positive for back pain.   Neurological:  Negative for weakness and numbness.       Objective   Physical Exam  Constitutional:       Appearance: Normal appearance. She is well-developed.   Neck:      Thyroid: No thyromegaly.   Cardiovascular:      Rate and Rhythm: Normal rate and regular rhythm.      Heart sounds: Normal heart sounds.   Pulmonary:      Effort: Pulmonary effort is normal.      Breath sounds: Normal breath sounds.   Musculoskeletal:      Cervical back: Normal range of motion and neck supple.      Lumbar back: Spasms and bony tenderness (left SI joint) present. No swelling, edema, deformity, signs of trauma or lacerations. Decreased range of motion.        Back:       Comments: Unable to assess for straight leg test due to pain.    Lymphadenopathy:      Cervical: No cervical adenopathy.   Skin:     General: Skin is warm and dry.   Neurological:      Mental Status: She is alert.   Psychiatric:         Behavior: Behavior normal.         Thought Content: Thought content normal.         Judgment: Judgment normal.         Vitals:    02/03/25 0840   BP: 108/72    Pulse: 65   SpO2: 98%     Body mass index is 28.74 kg/m².      Current Outpatient Medications:     buPROPion XL (WELLBUTRIN XL) 300 MG 24 hr tablet, Take 1 tablet by mouth Daily., Disp: , Rfl:     escitalopram (LEXAPRO) 10 MG tablet, Take 1.5 tablets by mouth Daily., Disp: 135 tablet, Rfl: 3    melatonin 5 MG tablet tablet, Take 1 tablet by mouth At Night As Needed (Sleep)., Disp: , Rfl:     Omega-3 Fatty Acids (FISH OIL) 1000 MG capsule capsule, Take 1 capsule by mouth Daily With Breakfast. HOLD FOR SURGERY, Disp: , Rfl:     zolpidem (AMBIEN) 10 MG tablet, Take 1 tablet by mouth At Night As Needed for Sleep., Disp: 90 tablet, Rfl: 0    cyclobenzaprine (FLEXERIL) 5 MG tablet, Take 1 tablet by mouth At Night As Needed for Muscle Spasms., Disp: 30 tablet, Rfl: 0    predniSONE (DELTASONE) 20 MG tablet, Take 3 tabs for 3 days, 2 tabs for 3 days, then 1 tab for 3 days, Disp: 18 tablet, Rfl: 0   Assessment & Plan   Diagnoses and all orders for this visit:    1. Acute left-sided low back pain, unspecified whether sciatica present (Primary)  -     predniSONE (DELTASONE) 20 MG tablet; Take 3 tabs for 3 days, 2 tabs for 3 days, then 1 tab for 3 days  Dispense: 18 tablet; Refill: 0  -     cyclobenzaprine (FLEXERIL) 5 MG tablet; Take 1 tablet by mouth At Night As Needed for Muscle Spasms.  Dispense: 30 tablet; Refill: 0    2. Piriformis syndrome of left side  -     predniSONE (DELTASONE) 20 MG tablet; Take 3 tabs for 3 days, 2 tabs for 3 days, then 1 tab for 3 days  Dispense: 18 tablet; Refill: 0  -     cyclobenzaprine (FLEXERIL) 5 MG tablet; Take 1 tablet by mouth At Night As Needed for Muscle Spasms.  Dispense: 30 tablet; Refill: 0                 1. Low back pain/piriformis- ?lumbar radiculopathy as well, start prednisone taper and flexeril, if not better consider L spine x-ray and PT.

## 2025-05-19 DIAGNOSIS — G47.00 INSOMNIA, UNSPECIFIED TYPE: ICD-10-CM

## 2025-05-19 RX ORDER — ZOLPIDEM TARTRATE 10 MG/1
10 TABLET ORAL NIGHTLY PRN
Qty: 90 TABLET | Refills: 0 | Status: SHIPPED | OUTPATIENT
Start: 2025-05-19

## 2025-06-12 ENCOUNTER — OFFICE VISIT (OUTPATIENT)
Dept: INTERNAL MEDICINE | Facility: CLINIC | Age: 60
End: 2025-06-12
Payer: COMMERCIAL

## 2025-06-12 VITALS
HEART RATE: 68 BPM | SYSTOLIC BLOOD PRESSURE: 92 MMHG | OXYGEN SATURATION: 98 % | HEIGHT: 66 IN | BODY MASS INDEX: 28.45 KG/M2 | DIASTOLIC BLOOD PRESSURE: 62 MMHG | WEIGHT: 177 LBS

## 2025-06-12 DIAGNOSIS — E78.2 MODERATE MIXED HYPERLIPIDEMIA NOT REQUIRING STATIN THERAPY: ICD-10-CM

## 2025-06-12 DIAGNOSIS — B35.1 ONYCHOMYCOSIS: ICD-10-CM

## 2025-06-12 DIAGNOSIS — R73.03 PREDIABETES: ICD-10-CM

## 2025-06-12 DIAGNOSIS — G47.00 INSOMNIA, UNSPECIFIED TYPE: ICD-10-CM

## 2025-06-12 DIAGNOSIS — R07.89 CHEST PRESSURE: ICD-10-CM

## 2025-06-12 DIAGNOSIS — F41.9 ANXIETY: ICD-10-CM

## 2025-06-12 DIAGNOSIS — Z00.00 HEALTHCARE MAINTENANCE: Primary | ICD-10-CM

## 2025-06-12 RX ORDER — TERBINAFINE HYDROCHLORIDE 250 MG/1
250 TABLET ORAL DAILY
Qty: 90 TABLET | Refills: 0 | Status: SHIPPED | OUTPATIENT
Start: 2025-06-12

## 2025-06-12 NOTE — PROGRESS NOTES
Subjective   Valerie Avina is a 60 y.o. female.     History of Present Illness   The patient is here today for CPE and lab work F/U.          The following portions of the patient's history were reviewed and updated as appropriate: allergies, current medications, past family history, past medical history, past social history, past surgical history and problem list.    Review of Systems    Objective   Physical Exam    Vitals:    06/12/25 0843   BP: 92/62   Pulse: 68   SpO2: 98%     Body mass index is 28.58 kg/m².      Current Outpatient Medications:     buPROPion XL (WELLBUTRIN XL) 300 MG 24 hr tablet, Take 1 tablet by mouth Daily., Disp: , Rfl:     escitalopram (LEXAPRO) 10 MG tablet, Take 1.5 tablets by mouth Daily., Disp: 135 tablet, Rfl: 3    melatonin 5 MG tablet tablet, Take 1 tablet by mouth At Night As Needed (Sleep)., Disp: , Rfl:     Omega-3 Fatty Acids (FISH OIL) 1000 MG capsule capsule, Take 1 capsule by mouth Daily With Breakfast. HOLD FOR SURGERY, Disp: , Rfl:     zolpidem (AMBIEN) 10 MG tablet, TAKE 1 TABLET BY MOUTH AT NIGHT AS NEEDED FOR SLEEP., Disp: 90 tablet, Rfl: 0    cyclobenzaprine (FLEXERIL) 5 MG tablet, Take 1 tablet by mouth At Night As Needed for Muscle Spasms., Disp: 30 tablet, Rfl: 0    predniSONE (DELTASONE) 20 MG tablet, Take 3 tabs for 3 days, 2 tabs for 3 days, then 1 tab for 3 days, Disp: 18 tablet, Rfl: 0   Assessment & Plan   There are no diagnoses linked to this encounter.    Appointment on 06/05/2025   Component Date Value Ref Range Status    Glucose 06/05/2025 92  65 - 99 mg/dL Final    BUN 06/05/2025 13.0  6.0 - 20.0 mg/dL Final    Creatinine 06/05/2025 0.81  0.57 - 1.00 mg/dL Final    EGFR Result 06/05/2025 83.7  >60.0 mL/min/1.73 Final    Comment: GFR Categories in Chronic Kidney Disease (CKD)  GFR Category          GFR (mL/min/1.73)    Interpretation  G1                    90 or greater        Normal or high  (1)  G2                    60-89                Mild  decrease  (1)  G3a                   45-59                Mild to moderate  decrease  G3b                   30-44                Moderate to  severe decrease  G4                    15-29                Severe decrease  G5                    14 or less           Kidney failure  (1)In the absence of evidence of kidney disease, neither  GFR category G1 or G2 fulfill the criteria for CKD.  eGFR calculation 2021 CKD-EPI creatinine equation, which  does not include race as a factor      BUN/Creatinine Ratio 06/05/2025 16.0  7.0 - 25.0 Final    Sodium 06/05/2025 141  136 - 145 mmol/L Final    Potassium 06/05/2025 5.0  3.5 - 5.2 mmol/L Final    Chloride 06/05/2025 105  98 - 107 mmol/L Final    Total CO2 06/05/2025 26.4  22.0 - 29.0 mmol/L Final    Calcium 06/05/2025 9.2  8.6 - 10.5 mg/dL Final    Total Protein 06/05/2025 6.7  6.0 - 8.5 g/dL Final    Albumin 06/05/2025 4.2  3.5 - 5.2 g/dL Final    Globulin 06/05/2025 2.5  gm/dL Final    A/G Ratio 06/05/2025 1.7  g/dL Final    Total Bilirubin 06/05/2025 0.3  0.0 - 1.2 mg/dL Final    Alkaline Phosphatase 06/05/2025 62  39 - 117 U/L Final    AST (SGOT) 06/05/2025 26  1 - 32 U/L Final    ALT (SGPT) 06/05/2025 32  1 - 33 U/L Final    WBC 06/05/2025 4.95  3.40 - 10.80 10*3/mm3 Final    RBC 06/05/2025 4.17  3.77 - 5.28 10*6/mm3 Final    Hemoglobin 06/05/2025 13.5  12.0 - 15.9 g/dL Final    Hematocrit 06/05/2025 41.6  34.0 - 46.6 % Final    MCV 06/05/2025 99.8 (H)  79.0 - 97.0 fL Final    MCH 06/05/2025 32.4  26.6 - 33.0 pg Final    MCHC 06/05/2025 32.5  31.5 - 35.7 g/dL Final    RDW 06/05/2025 12.9  12.3 - 15.4 % Final    Platelets 06/05/2025 251  140 - 450 10*3/mm3 Final    Neutrophil Rel % 06/05/2025 43.5  42.7 - 76.0 % Final    Lymphocyte Rel % 06/05/2025 43.4  19.6 - 45.3 % Final    Monocyte Rel % 06/05/2025 10.7  5.0 - 12.0 % Final    Eosinophil Rel % 06/05/2025 1.4  0.3 - 6.2 % Final    Basophil Rel % 06/05/2025 0.8  0.0 - 1.5 % Final    Neutrophils Absolute 06/05/2025 2.15   1.70 - 7.00 10*3/mm3 Final    Lymphocytes Absolute 06/05/2025 2.15  0.70 - 3.10 10*3/mm3 Final    Monocytes Absolute 06/05/2025 0.53  0.10 - 0.90 10*3/mm3 Final    Eosinophils Absolute 06/05/2025 0.07  0.00 - 0.40 10*3/mm3 Final    Basophils Absolute 06/05/2025 0.04  0.00 - 0.20 10*3/mm3 Final    Immature Granulocyte Rel % 06/05/2025 0.2  0.0 - 0.5 % Final    Immature Grans Absolute 06/05/2025 0.01  0.00 - 0.05 10*3/mm3 Final    nRBC 06/05/2025 0.0  0.0 - 0.2 /100 WBC Final    Total Cholesterol 06/05/2025 232 (H)  0 - 200 mg/dL Final    Comment: Cholesterol Reference Ranges  (U.S. Department of Health and Human Services ATP III  Classifications)  Desirable          <200 mg/dL  Borderline High    200-239 mg/dL  High Risk          >240 mg/dL  Triglyceride Reference Ranges  (U.S. Department of Health and Human Services ATP III  Classifications)  Normal           <150 mg/dL  Borderline High  150-199 mg/dL  High             200-499 mg/dL  Very High        >500 mg/dL  HDL Reference Ranges  (U.S. Department of Health and Human Services ATP III  Classifications)  Low     <40 mg/dl (major risk factor for CHD)  High    >60 mg/dl ('negative' risk factor for CHD)  LDL Reference Ranges  (U.S. Department of Health and Human Services ATP III  Classifications)  Optimal          <100 mg/dL  Near Optimal     100-129 mg/dL  Borderline High  130-159 mg/dL  High             160-189 mg/dL  Very High        >189 mg/dL  LDL is calculated using the NIH LDL-C calculation.      Triglycerides 06/05/2025 109  0 - 150 mg/dL Final    HDL Cholesterol 06/05/2025 71 (H)  40 - 60 mg/dL Final    VLDL Cholesterol Braulio 06/05/2025 19  5 - 40 mg/dL Final    LDL Chol Calc (NIH) 06/05/2025 142 (H)  0 - 100 mg/dL Final    Chol/HDL Ratio 06/05/2025 3.27   Final    Hemoglobin A1C 06/05/2025 5.30  4.80 - 5.60 % Final    Comment: Hemoglobin A1C Ranges:  Increased Risk for Diabetes  5.7% to 6.4%  Diabetes                     >= 6.5%  Diabetic Goal                <  7.0%               1. Diagnoses and all orders for this visit:    1. Healthcare maintenance (Primary)    2. Chest pressure  -     CT Cardiac Calcium Score Without Dye; Future  -     ECG 12 Lead    3. Moderate mixed hyperlipidemia not requiring statin therapy  -     CT Cardiac Calcium Score Without Dye; Future  -     Comprehensive Metabolic Panel; Future  -     Lipid Panel With / Chol / HDL Ratio; Future    4. Onychomycosis  -     terbinafine (lamiSIL) 250 MG tablet; Take 1 tablet by mouth Daily.  Dispense: 90 tablet; Refill: 0    5. Insomnia, unspecified type    6. Prediabetes  -     Comprehensive Metabolic Panel; Future  -     Hemoglobin A1c; Future    7. Anxiety

## 2025-06-12 NOTE — PROGRESS NOTES
"Subjective   Valerie Avina is a 60 y.o. female and is here for a comprehensive physical exam. The patient reports :she has had chest pressure occasionally in the evenings. It doesn't last long. She hasn't had any in the last few weeks.     Do you take any herbs or supplements that were not prescribed by a doctor? no    Patient is here to follow-up on insomnia.  She takes ambien as needed for sleep.  States that since she has been on Lexapro, she has not needed Ambien as often.    Patient is here to follow-up on depression and anxiety.  She is doing well with Wellbutrin and Lexapro.    Patient complains of toenail fungus on her left great toe.  She does get pedicures done once a month, but has not had one since last month due to her toenail concern     History:  LMP: No LMP recorded. Patient has had a hysterectomy.  Sees [facility]    The following portions of the patient's history were reviewed and updated as appropriate: allergies, current medications, past family history, past medical history, past social history, past surgical history and problem list.    Review of Systems  Do you have pain that bothers you in your daily life? no  Pertinent items are noted in HPI.    Objective   BP 92/62   Pulse 68   Ht 167.6 cm (65.98\")   Wt 80.3 kg (177 lb)   SpO2 98%   BMI 28.58 kg/m²     General Appearance:    Alert, cooperative, no distress, appears stated age   Head:    Normocephalic, without obvious abnormality, atraumatic   Eyes:    PERRL, conjunctiva/corneas clear, EOM's intact, both eyes   Ears:    Normal TM's and external ear canals, both ears   Nose:   Nares normal, septum midline, mucosa normal, no drainage    or sinus tenderness   Throat:   Lips, mucosa, and tongue normal; teeth and gums normal   Neck:   Supple, symmetrical, trachea midline, no adenopathy;     thyroid:  no enlargement/tenderness/nodules   Back:     Symmetric, no curvature, ROM normal, no CVA tenderness   Lungs:     Clear to auscultation " bilaterally, respirations unlabored   Chest Wall:    No tenderness or deformity    Heart:    Regular rate and rhythm, S1 and S2 normal, no murmur       Abdomen:     Soft, non-tender, bowel sounds active all four quadrants,     no masses, no organomegaly           Extremities:   Extremities normal, atraumatic, no cyanosis or edema   Pulses:   2+ and symmetric all extremities   Skin:   Skin color, texture, turgor normal, no rashes or lesions; left great toe nail thickened, yellow   Lymph nodes:   Cervical, supraclavicular, and axillary nodes normal   Neurologic:   Grossly intact, normal strength, sensation and reflexes     throughout        Appointment on 06/05/2025   Component Date Value Ref Range Status    Glucose 06/05/2025 92  65 - 99 mg/dL Final    BUN 06/05/2025 13.0  6.0 - 20.0 mg/dL Final    Creatinine 06/05/2025 0.81  0.57 - 1.00 mg/dL Final    EGFR Result 06/05/2025 83.7  >60.0 mL/min/1.73 Final    Comment: GFR Categories in Chronic Kidney Disease (CKD)  GFR Category          GFR (mL/min/1.73)    Interpretation  G1                    90 or greater        Normal or high  (1)  G2                    60-89                Mild decrease  (1)  G3a                   45-59                Mild to moderate  decrease  G3b                   30-44                Moderate to  severe decrease  G4                    15-29                Severe decrease  G5                    14 or less           Kidney failure  (1)In the absence of evidence of kidney disease, neither  GFR category G1 or G2 fulfill the criteria for CKD.  eGFR calculation 2021 CKD-EPI creatinine equation, which  does not include race as a factor      BUN/Creatinine Ratio 06/05/2025 16.0  7.0 - 25.0 Final    Sodium 06/05/2025 141  136 - 145 mmol/L Final    Potassium 06/05/2025 5.0  3.5 - 5.2 mmol/L Final    Chloride 06/05/2025 105  98 - 107 mmol/L Final    Total CO2 06/05/2025 26.4  22.0 - 29.0 mmol/L Final    Calcium 06/05/2025 9.2  8.6 - 10.5 mg/dL Final     Total Protein 06/05/2025 6.7  6.0 - 8.5 g/dL Final    Albumin 06/05/2025 4.2  3.5 - 5.2 g/dL Final    Globulin 06/05/2025 2.5  gm/dL Final    A/G Ratio 06/05/2025 1.7  g/dL Final    Total Bilirubin 06/05/2025 0.3  0.0 - 1.2 mg/dL Final    Alkaline Phosphatase 06/05/2025 62  39 - 117 U/L Final    AST (SGOT) 06/05/2025 26  1 - 32 U/L Final    ALT (SGPT) 06/05/2025 32  1 - 33 U/L Final    WBC 06/05/2025 4.95  3.40 - 10.80 10*3/mm3 Final    RBC 06/05/2025 4.17  3.77 - 5.28 10*6/mm3 Final    Hemoglobin 06/05/2025 13.5  12.0 - 15.9 g/dL Final    Hematocrit 06/05/2025 41.6  34.0 - 46.6 % Final    MCV 06/05/2025 99.8 (H)  79.0 - 97.0 fL Final    MCH 06/05/2025 32.4  26.6 - 33.0 pg Final    MCHC 06/05/2025 32.5  31.5 - 35.7 g/dL Final    RDW 06/05/2025 12.9  12.3 - 15.4 % Final    Platelets 06/05/2025 251  140 - 450 10*3/mm3 Final    Neutrophil Rel % 06/05/2025 43.5  42.7 - 76.0 % Final    Lymphocyte Rel % 06/05/2025 43.4  19.6 - 45.3 % Final    Monocyte Rel % 06/05/2025 10.7  5.0 - 12.0 % Final    Eosinophil Rel % 06/05/2025 1.4  0.3 - 6.2 % Final    Basophil Rel % 06/05/2025 0.8  0.0 - 1.5 % Final    Neutrophils Absolute 06/05/2025 2.15  1.70 - 7.00 10*3/mm3 Final    Lymphocytes Absolute 06/05/2025 2.15  0.70 - 3.10 10*3/mm3 Final    Monocytes Absolute 06/05/2025 0.53  0.10 - 0.90 10*3/mm3 Final    Eosinophils Absolute 06/05/2025 0.07  0.00 - 0.40 10*3/mm3 Final    Basophils Absolute 06/05/2025 0.04  0.00 - 0.20 10*3/mm3 Final    Immature Granulocyte Rel % 06/05/2025 0.2  0.0 - 0.5 % Final    Immature Grans Absolute 06/05/2025 0.01  0.00 - 0.05 10*3/mm3 Final    nRBC 06/05/2025 0.0  0.0 - 0.2 /100 WBC Final    Total Cholesterol 06/05/2025 232 (H)  0 - 200 mg/dL Final    Comment: Cholesterol Reference Ranges  (U.S. Department of Health and Human Services ATP III  Classifications)  Desirable          <200 mg/dL  Borderline High    200-239 mg/dL  High Risk          >240 mg/dL  Triglyceride Reference Ranges  (U.S. Department  of Health and Human Services ATP III  Classifications)  Normal           <150 mg/dL  Borderline High  150-199 mg/dL  High             200-499 mg/dL  Very High        >500 mg/dL  HDL Reference Ranges  (U.S. Department of Health and Human Services ATP III  Classifications)  Low     <40 mg/dl (major risk factor for CHD)  High    >60 mg/dl ('negative' risk factor for CHD)  LDL Reference Ranges  (U.S. Department of Health and Human Services ATP III  Classifications)  Optimal          <100 mg/dL  Near Optimal     100-129 mg/dL  Borderline High  130-159 mg/dL  High             160-189 mg/dL  Very High        >189 mg/dL  LDL is calculated using the NIH LDL-C calculation.      Triglycerides 06/05/2025 109  0 - 150 mg/dL Final    HDL Cholesterol 06/05/2025 71 (H)  40 - 60 mg/dL Final    VLDL Cholesterol Braulio 06/05/2025 19  5 - 40 mg/dL Final    LDL Chol Calc ([facility]) 06/05/2025 142 (H)  0 - 100 mg/dL Final    Chol/HDL Ratio 06/05/2025 3.27   Final    Hemoglobin A1C 06/05/2025 5.30  4.80 - 5.60 % Final    Comment: Hemoglobin A1C Ranges:  Increased Risk for Diabetes  5.7% to 6.4%  Diabetes                     >= 6.5%  Diabetic Goal                < 7.0%       Reviewed labs with patient.       ECG 12 Lead    Date/Time: 6/12/2025 9:19 AM  Performed by: Laura Garcia APRN    Authorized by: Laura Garcia APRN  Comparison: not compared with previous ECG   Previous ECG: no previous ECG available  Rhythm: sinus rhythm  Rate: normal  BPM: 64  Conduction: conduction normal  ST Segments: ST segments normal  T Waves: T waves normal  QRS axis: normal    Clinical impression: normal ECG  Comments: NV interval 164 ms  QRS interval 94 ms  QTc interval 409 ms              Assessment & Plan   Healthy female exam.      1. Diagnoses and all orders for this visit:    1. Healthcare maintenance (Primary)    2. Chest pressure  -     CT Cardiac Calcium Score Without Dye; Future  -     ECG 12 Lead    3. Moderate mixed hyperlipidemia not requiring statin  therapy  -     CT Cardiac Calcium Score Without Dye; Future  -     Comprehensive Metabolic Panel; Future  -     Lipid Panel With / Chol / HDL Ratio; Future    4. Onychomycosis  -     terbinafine (lamiSIL) 250 MG tablet; Take 1 tablet by mouth Daily.  Dispense: 90 tablet; Refill: 0    5. Insomnia, unspecified type    6. Prediabetes  -     Comprehensive Metabolic Panel; Future  -     Hemoglobin A1c; Future    7. Anxiety    Chest pressure - EKG done this visit - normal. Will check a CT cardiac calcium score    Onychomycosis -patient will be treated with Lamisil.    Insomnia -continue Ambien as needed.  Alex reviewed today.  CSA updated today.    Prediabetes -patient's hemoglobin A1c has improved and is normal    Anxiety -continue Lexapro and Wellbutrin.      2. Patient Counseling:  --Nutrition: Stressed importance of moderation in sodium/caffeine intake, saturated fat and cholesterol, caloric balance, sufficient intake of fresh fruits, vegetables, fiber, calcium, iron.  --Exercise: Stressed the importance of regular exercise.   --Dental health: Discussed importance of regular tooth brushing, flossing, and dental visits.   --Immunizations reviewed.    3. Discussed the patient's BMI with her.  The BMI is above average; BMI management plan is completed        4. Follow up in 6 months for recheck with fasting labs prior

## (undated) DEVICE — GOWN ISOL W/THUMB UNIV BLU BX/15

## (undated) DEVICE — BNDG,ELSTC,MATRIX,STRL,4"X5YD,LF,HOOK&LP: Brand: MEDLINE

## (undated) DEVICE — FLEX ADVANTAGE 1500CC: Brand: FLEX ADVANTAGE

## (undated) DEVICE — BLD DISSCT COOL CUT SJ CRVD 4MM 13CM

## (undated) DEVICE — TBG ARTHSCP PT W CONN/REDUC 8FT

## (undated) DEVICE — GLV SURG BIOGEL LTX PF 6 1/2

## (undated) DEVICE — DRSNG WND GZ CURAD OIL EMULSION 3X3IN STRL

## (undated) DEVICE — PROB ABL APOLLORF MP50 ASP 50DEG

## (undated) DEVICE — SKIN PREP TRAY W/CHG: Brand: MEDLINE INDUSTRIES, INC.

## (undated) DEVICE — Device

## (undated) DEVICE — KT ORCA ORCAPOD DISP STRL

## (undated) DEVICE — UNDERCAST PADDING: Brand: DEROYAL

## (undated) DEVICE — SUT ETHLN 3/0 PS1 18IN 1663H

## (undated) DEVICE — CANN NASL CO2 TRULINK W/O2 A/